# Patient Record
Sex: FEMALE | Race: WHITE | NOT HISPANIC OR LATINO | Employment: FULL TIME | URBAN - METROPOLITAN AREA
[De-identification: names, ages, dates, MRNs, and addresses within clinical notes are randomized per-mention and may not be internally consistent; named-entity substitution may affect disease eponyms.]

---

## 2018-02-02 ENCOUNTER — HOSPITAL ENCOUNTER (OUTPATIENT)
Facility: MEDICAL CENTER | Age: 53
End: 2018-02-02
Payer: COMMERCIAL

## 2018-02-02 LAB
ALBUMIN SERPL BCP-MCNC: 4.2 G/DL (ref 3.2–4.9)
ALBUMIN/GLOB SERPL: 1.4 G/DL
ALP SERPL-CCNC: 118 U/L (ref 30–99)
ALT SERPL-CCNC: 20 U/L (ref 2–50)
ANION GAP SERPL CALC-SCNC: 6 MMOL/L (ref 0–11.9)
AST SERPL-CCNC: 23 U/L (ref 12–45)
BDY FAT % MEASURED: 29.7 %
BILIRUB SERPL-MCNC: 0.4 MG/DL (ref 0.1–1.5)
BP DIAS: 90 MMHG
BP SYS: 150 MMHG
BUN SERPL-MCNC: 10 MG/DL (ref 8–22)
CALCIUM SERPL-MCNC: 8.9 MG/DL (ref 8.5–10.5)
CHLORIDE SERPL-SCNC: 108 MMOL/L (ref 96–112)
CHOLEST SERPL-MCNC: 169 MG/DL (ref 100–199)
CO2 SERPL-SCNC: 27 MMOL/L (ref 20–33)
CREAT SERPL-MCNC: 0.66 MG/DL (ref 0.5–1.4)
DIABETES HTDIA: NO
ERYTHROCYTE [DISTWIDTH] IN BLOOD BY AUTOMATED COUNT: 52.3 FL (ref 35.9–50)
EVENT NAME HTEVT: NORMAL
GLOBULIN SER CALC-MCNC: 3.1 G/DL (ref 1.9–3.5)
GLUCOSE SERPL-MCNC: 88 MG/DL (ref 65–99)
HCT VFR BLD AUTO: 40.4 % (ref 37–47)
HDLC SERPL-MCNC: 52 MG/DL
HGB BLD-MCNC: 11.2 G/DL (ref 12–16)
HYPERTENSION HTHYP: YES
LDLC SERPL CALC-MCNC: 101 MG/DL
MCH RBC QN AUTO: 21.3 PG (ref 27–33)
MCHC RBC AUTO-ENTMCNC: 27.7 G/DL (ref 33.6–35)
MCV RBC AUTO: 76.7 FL (ref 81.4–97.8)
PLATELET # BLD AUTO: 367 K/UL (ref 164–446)
PMV BLD AUTO: 10.4 FL (ref 9–12.9)
POTASSIUM SERPL-SCNC: 4.6 MMOL/L (ref 3.6–5.5)
PROT SERPL-MCNC: 7.3 G/DL (ref 6–8.2)
RBC # BLD AUTO: 5.27 M/UL (ref 4.2–5.4)
SCREENING LOC CITY HTCIT: NORMAL
SCREENING LOC STATE HTSTA: NORMAL
SCREENING LOCATION HTLOC: NORMAL
SODIUM SERPL-SCNC: 141 MMOL/L (ref 135–145)
SUBSCRIBER ID HTSID: NORMAL
TRIGL SERPL-MCNC: 81 MG/DL (ref 0–149)
WBC # BLD AUTO: 8.9 K/UL (ref 4.8–10.8)

## 2018-03-12 ENCOUNTER — HOSPITAL ENCOUNTER (OUTPATIENT)
Dept: RADIOLOGY | Facility: MEDICAL CENTER | Age: 53
End: 2018-03-12

## 2018-03-21 ENCOUNTER — HOSPITAL ENCOUNTER (OUTPATIENT)
Dept: LAB | Facility: MEDICAL CENTER | Age: 53
End: 2018-03-21
Attending: FAMILY MEDICINE
Payer: COMMERCIAL

## 2018-03-21 ENCOUNTER — OFFICE VISIT (OUTPATIENT)
Dept: MEDICAL GROUP | Facility: PHYSICIAN GROUP | Age: 53
End: 2018-03-21
Payer: COMMERCIAL

## 2018-03-21 VITALS
HEIGHT: 65 IN | OXYGEN SATURATION: 98 % | BODY MASS INDEX: 24.16 KG/M2 | WEIGHT: 145 LBS | TEMPERATURE: 97.4 F | SYSTOLIC BLOOD PRESSURE: 142 MMHG | HEART RATE: 71 BPM | DIASTOLIC BLOOD PRESSURE: 70 MMHG | RESPIRATION RATE: 14 BRPM

## 2018-03-21 DIAGNOSIS — I10 BENIGN ESSENTIAL HTN: ICD-10-CM

## 2018-03-21 DIAGNOSIS — Z20.2 STD EXPOSURE: ICD-10-CM

## 2018-03-21 DIAGNOSIS — Z72.0 TOBACCO ABUSE: ICD-10-CM

## 2018-03-21 LAB — HIV 1+2 AB+HIV1 P24 AG SERPL QL IA: NON REACTIVE

## 2018-03-21 PROCEDURE — 99204 OFFICE O/P NEW MOD 45 MIN: CPT | Performed by: FAMILY MEDICINE

## 2018-03-21 PROCEDURE — 36415 COLL VENOUS BLD VENIPUNCTURE: CPT

## 2018-03-21 PROCEDURE — 87389 HIV-1 AG W/HIV-1&-2 AB AG IA: CPT

## 2018-03-21 RX ORDER — BUPROPION HYDROCHLORIDE 150 MG/1
150 TABLET ORAL EVERY MORNING
Qty: 30 TAB | Refills: 3 | Status: SHIPPED | OUTPATIENT
Start: 2018-03-21 | End: 2018-06-20 | Stop reason: SDUPTHER

## 2018-03-21 RX ORDER — TRIAMTERENE AND HYDROCHLOROTHIAZIDE 37.5; 25 MG/1; MG/1
1 TABLET ORAL DAILY
Qty: 30 TAB | Refills: 3 | Status: SHIPPED | OUTPATIENT
Start: 2018-03-21 | End: 2018-05-08 | Stop reason: SDUPTHER

## 2018-03-21 ASSESSMENT — ENCOUNTER SYMPTOMS
PSYCHIATRIC NEGATIVE: 1
GASTROINTESTINAL NEGATIVE: 1
HEMOPTYSIS: 0
COUGH: 0
FEVER: 0
MYALGIAS: 0
PALPITATIONS: 0
CHILLS: 0
EYES NEGATIVE: 1
HYPERTENSION: 1
CONSTITUTIONAL NEGATIVE: 1
RESPIRATORY NEGATIVE: 1
CARDIOVASCULAR NEGATIVE: 1
CONSTIPATION: 0
NECK PAIN: 0
DIZZINESS: 0
MUSCULOSKELETAL NEGATIVE: 1
HEADACHES: 0
BLURRED VISION: 0
NEUROLOGICAL NEGATIVE: 1

## 2018-03-21 NOTE — PROGRESS NOTES
Subjective:      Brent Bailey is a 52 y.o. female who presents with Hypertension and Orders Needed (labs)            Was on meds before for htn and then stopped meds  150/90 at [facility] and high systolic today    There are no diagnoses linked to this encounter.  Past Medical History:  No date: Hypertension  History reviewed. No pertinent surgical history.  Smoking status: Current Every Day Smoker                                                   Packs/day: 0.50      Years: 20.00        Types: Cigarettes  Smokeless tobacco: Never Used                      Alcohol use: No              History reviewed.  No pertinent family history.      No current outpatient prescriptions on file.    Patient was instructed on the use of medications, either prescriptions or OTC and informed on when the appropriate follow up time period should be. In addition, patient was also instructed that should any acute worsening occur that they should notify this clinic asap or call 911.          Hypertension   This is a chronic problem. The current episode started more than 1 year ago. The problem has been waxing and waning since onset. The problem is uncontrolled. Pertinent negatives include no anxiety, blurred vision, chest pain, headaches, neck pain or palpitations. There are no known risk factors for coronary artery disease. Past treatments include nothing. There are no compliance problems.        Review of Systems   Constitutional: Negative.  Negative for chills and fever.        Past Medical History:  No date: Hypertension  History reviewed. No pertinent surgical history.  Smoking status: Current Every Day Smoker                                                   Packs/day: 0.50      Years: 20.00        Types: Cigarettes  Smokeless tobacco: Never Used                      Alcohol use: No              History reviewed.  No pertinent family history.     HENT: Negative.    Eyes: Negative.  Negative for blurred vision.   Respiratory:  "Negative.  Negative for cough and hemoptysis.    Cardiovascular: Negative.  Negative for chest pain and palpitations.   Gastrointestinal: Negative.  Negative for constipation.   Genitourinary: Negative.  Negative for dysuria and urgency.   Musculoskeletal: Negative.  Negative for myalgias and neck pain.   Skin: Negative.  Negative for rash.   Neurological: Negative.  Negative for dizziness and headaches.   Endo/Heme/Allergies: Negative.    Psychiatric/Behavioral: Negative.  Negative for suicidal ideas.          Objective:     /70   Pulse 71   Temp 36.3 °C (97.4 °F)   Resp 14   Ht 1.638 m (5' 4.5\")   Wt 65.8 kg (145 lb)   SpO2 98%   BMI 24.50 kg/m²      Physical Exam   Constitutional: She is oriented to person, place, and time. She appears well-developed and well-nourished. No distress.   HENT:   Head: Normocephalic and atraumatic.   Right Ear: External ear normal.   Left Ear: External ear normal.   Nose: Nose normal.   Mouth/Throat: Oropharynx is clear and moist. No oropharyngeal exudate.   Eyes: Pupils are equal, round, and reactive to light. Right eye exhibits no discharge. Left eye exhibits no discharge. No scleral icterus.   Neck: Normal range of motion. Neck supple. No JVD present. No tracheal deviation present. No thyromegaly present.   Cardiovascular: Normal rate, regular rhythm, normal heart sounds and intact distal pulses.  Exam reveals no gallop and no friction rub.    No murmur heard.  Pulmonary/Chest: Effort normal and breath sounds normal. No stridor. No respiratory distress. She has no wheezes. She has no rales. She exhibits no tenderness.   Abdominal: Soft. She exhibits no distension. There is no tenderness.   Lymphadenopathy:     She has no cervical adenopathy.   Neurological: She is alert and oriented to person, place, and time. No cranial nerve deficit.   Skin: She is not diaphoretic.   Psychiatric: She has a normal mood and affect. Her behavior is normal. Judgment and thought content " normal.   Nursing note and vitals reviewed.              Assessment/Plan:     htn - not on meds and with high systolic  Will start maxzide and f/u in 6 weeks    Also wants to stop smoking and will try zyban and nicotine gum prn

## 2018-05-08 ENCOUNTER — OFFICE VISIT (OUTPATIENT)
Dept: MEDICAL GROUP | Facility: PHYSICIAN GROUP | Age: 53
End: 2018-05-08
Payer: COMMERCIAL

## 2018-05-08 VITALS
BODY MASS INDEX: 24.16 KG/M2 | SYSTOLIC BLOOD PRESSURE: 122 MMHG | WEIGHT: 145 LBS | OXYGEN SATURATION: 97 % | TEMPERATURE: 96.9 F | HEART RATE: 73 BPM | DIASTOLIC BLOOD PRESSURE: 60 MMHG | HEIGHT: 65 IN | RESPIRATION RATE: 14 BRPM

## 2018-05-08 DIAGNOSIS — Z23 NEED FOR TDAP VACCINATION: ICD-10-CM

## 2018-05-08 DIAGNOSIS — I10 BENIGN ESSENTIAL HTN: ICD-10-CM

## 2018-05-08 DIAGNOSIS — Z00.00 WELL ADULT EXAM: ICD-10-CM

## 2018-05-08 PROCEDURE — 90471 IMMUNIZATION ADMIN: CPT | Performed by: FAMILY MEDICINE

## 2018-05-08 PROCEDURE — 99396 PREV VISIT EST AGE 40-64: CPT | Mod: 25 | Performed by: FAMILY MEDICINE

## 2018-05-08 PROCEDURE — 90715 TDAP VACCINE 7 YRS/> IM: CPT | Performed by: FAMILY MEDICINE

## 2018-05-08 RX ORDER — TRIAMTERENE AND HYDROCHLOROTHIAZIDE 37.5; 25 MG/1; MG/1
1 TABLET ORAL DAILY
Qty: 90 TAB | Refills: 1 | Status: SHIPPED | OUTPATIENT
Start: 2018-05-08 | End: 2018-12-11 | Stop reason: SDUPTHER

## 2018-05-08 ASSESSMENT — ENCOUNTER SYMPTOMS
CONSTITUTIONAL NEGATIVE: 1
DIZZINESS: 0
PSYCHIATRIC NEGATIVE: 1
HEADACHES: 0
NECK PAIN: 0
CONSTIPATION: 0
CHILLS: 0
MUSCULOSKELETAL NEGATIVE: 1
NEUROLOGICAL NEGATIVE: 1
FEVER: 0
GASTROINTESTINAL NEGATIVE: 1
COUGH: 0
MYALGIAS: 0
CARDIOVASCULAR NEGATIVE: 1
EYES NEGATIVE: 1
RESPIRATORY NEGATIVE: 1
PALPITATIONS: 0
HEMOPTYSIS: 0

## 2018-05-08 ASSESSMENT — PATIENT HEALTH QUESTIONNAIRE - PHQ9: CLINICAL INTERPRETATION OF PHQ2 SCORE: 0

## 2018-06-20 DIAGNOSIS — Z72.0 TOBACCO ABUSE: ICD-10-CM

## 2018-06-20 RX ORDER — BUPROPION HYDROCHLORIDE 150 MG/1
150 TABLET ORAL EVERY MORNING
Qty: 30 TAB | Refills: 3 | Status: SHIPPED | OUTPATIENT
Start: 2018-06-20 | End: 2018-11-26 | Stop reason: SDUPTHER

## 2018-06-20 NOTE — TELEPHONE ENCOUNTER
Was the patient seen in the last year in this department? Yes     Does patient have an active prescription for medications requested? No     Received Request Via: Patient     Last Visit: 05/08/18  Last Labs: 02/02/18  Next Appt: 11/12/18

## 2018-10-30 ENCOUNTER — IMMUNIZATION (OUTPATIENT)
Dept: SOCIAL WORK | Facility: CLINIC | Age: 53
End: 2018-10-30
Payer: COMMERCIAL

## 2018-10-30 DIAGNOSIS — Z23 NEED FOR VACCINATION: ICD-10-CM

## 2018-10-30 PROCEDURE — 90686 IIV4 VACC NO PRSV 0.5 ML IM: CPT | Performed by: REGISTERED NURSE

## 2018-10-30 PROCEDURE — 90471 IMMUNIZATION ADMIN: CPT | Performed by: REGISTERED NURSE

## 2018-11-26 DIAGNOSIS — Z72.0 TOBACCO ABUSE: ICD-10-CM

## 2018-11-26 RX ORDER — BUPROPION HYDROCHLORIDE 150 MG/1
150 TABLET ORAL EVERY MORNING
Qty: 30 TAB | Refills: 3 | Status: SHIPPED | OUTPATIENT
Start: 2018-11-26 | End: 2018-12-11 | Stop reason: SDUPTHER

## 2018-11-26 NOTE — TELEPHONE ENCOUNTER
Patient had an appointment with you on the 12th which was cancelled due to you being out. She is out of meds and is requesting a refill please to [facility]. She is scheduling a f/v.    Was the patient seen in the last year in this department? Yes    Does patient have an active prescription for medications requested? Yes    Received Request Via: Patient    Last Visit: 5/8/18  Last Lab: 2/2/18

## 2018-12-11 ENCOUNTER — OFFICE VISIT (OUTPATIENT)
Dept: MEDICAL GROUP | Facility: PHYSICIAN GROUP | Age: 53
End: 2018-12-11
Payer: COMMERCIAL

## 2018-12-11 VITALS
RESPIRATION RATE: 16 BRPM | DIASTOLIC BLOOD PRESSURE: 62 MMHG | TEMPERATURE: 98 F | SYSTOLIC BLOOD PRESSURE: 100 MMHG | HEART RATE: 68 BPM | HEIGHT: 65 IN | OXYGEN SATURATION: 98 % | BODY MASS INDEX: 22.09 KG/M2 | WEIGHT: 132.6 LBS

## 2018-12-11 DIAGNOSIS — J01.00 ACUTE NON-RECURRENT MAXILLARY SINUSITIS: ICD-10-CM

## 2018-12-11 DIAGNOSIS — Z72.0 TOBACCO ABUSE: ICD-10-CM

## 2018-12-11 DIAGNOSIS — Z12.11 ENCOUNTER FOR SCREENING FECAL OCCULT BLOOD TESTING: Primary | ICD-10-CM

## 2018-12-11 DIAGNOSIS — Z01.419 WELL WOMAN EXAM: ICD-10-CM

## 2018-12-11 DIAGNOSIS — I10 BENIGN ESSENTIAL HTN: ICD-10-CM

## 2018-12-11 PROCEDURE — 99214 OFFICE O/P EST MOD 30 MIN: CPT | Performed by: FAMILY MEDICINE

## 2018-12-11 RX ORDER — BUPROPION HYDROCHLORIDE 150 MG/1
150 TABLET ORAL EVERY MORNING
Qty: 90 TAB | Refills: 3 | Status: SHIPPED | OUTPATIENT
Start: 2018-12-11 | End: 2019-08-12 | Stop reason: SDUPTHER

## 2018-12-11 RX ORDER — AMOXICILLIN 500 MG/1
500 CAPSULE ORAL 3 TIMES DAILY
Qty: 30 CAP | Refills: 0 | Status: SHIPPED | OUTPATIENT
Start: 2018-12-11 | End: 2021-12-06

## 2018-12-11 RX ORDER — BENZONATATE 100 MG/1
100 CAPSULE ORAL 3 TIMES DAILY PRN
Qty: 60 CAP | Refills: 0 | Status: SHIPPED | OUTPATIENT
Start: 2018-12-11 | End: 2021-12-06

## 2018-12-11 RX ORDER — TRIAMTERENE AND HYDROCHLOROTHIAZIDE 37.5; 25 MG/1; MG/1
1 TABLET ORAL DAILY
Qty: 90 TAB | Refills: 1 | Status: SHIPPED | OUTPATIENT
Start: 2018-12-11 | End: 2019-08-12 | Stop reason: SDUPTHER

## 2018-12-11 ASSESSMENT — ENCOUNTER SYMPTOMS
NAUSEA: 0
EYES NEGATIVE: 1
DEPRESSION: 0
RESPIRATORY NEGATIVE: 1
DIZZINESS: 0
BRUISES/BLEEDS EASILY: 0
PSYCHIATRIC NEGATIVE: 1
NEUROLOGICAL NEGATIVE: 1
TINGLING: 0
MYALGIAS: 0
SINUS PAIN: 1
CARDIOVASCULAR NEGATIVE: 1
MUSCULOSKELETAL NEGATIVE: 1
PALPITATIONS: 0
HEMOPTYSIS: 0
HEADACHES: 0
DOUBLE VISION: 0
BLURRED VISION: 0
HEARTBURN: 0
FEVER: 0
GASTROINTESTINAL NEGATIVE: 1
CONSTITUTIONAL NEGATIVE: 1
COUGH: 0
CHILLS: 0

## 2018-12-11 NOTE — PROGRESS NOTES
Subjective:      Brent Bailey is a 52 y.o. female who presents with Hypertension (Med refills) and Sinusitis (x3 weeks)            1. Tobacco abuse  Patient is still currently using tobacco. They were counseled on the importance of cessation and various behavioural and pharmacological ways of achieving this.  UNCONTROLLED    - buPROPion (WELLBUTRIN XL) 150 MG XL tablet; Take 1 Tab by mouth every morning.  Dispense: 90 Tab; Refill: 3    2. Benign essential HTN  Currently treated for HTN, taking meds with no CP or sob, monitors bp at home periodically. controlled    - triamterene-hctz (MAXZIDE-25/DYAZIDE) 37.5-25 MG Tab; Take 1 Tab by mouth every day.  Dispense: 90 Tab; Refill: 1    3. Encounter for screening fecal occult blood testing    - OCCULT BLOOD FECES IMMUNOASSAY (FIT); Future    4. Well woman exam  =  - MA-MAMMO SCREENING BILAT W/JAMES W/O CAD; Future    5. Acute non-recurrent maxillary sinusitis  Cough and sinus drainage for one week  - amoxicillin (AMOXIL) 500 MG Cap; Take 1 Cap by mouth 3 times a day.  Dispense: 30 Cap; Refill: 0  - benzonatate (TESSALON) 100 MG Cap; Take 1 Cap by mouth 3 times a day as needed for Cough.  Dispense: 60 Cap; Refill: 0    Past Medical History:  No date: Hypertension  History reviewed. No pertinent surgical history.  Smoking status: Former Smoker                                                              Packs/day: 0.50      Years: 20.00        Types: Cigarettes     Quit date: 6/11/2018  Smokeless tobacco: Never Used                      Alcohol use: Yes              Comment: occassional wine    History reviewed.  No pertinent family history.      Current Outpatient Prescriptions: •  buPROPion (WELLBUTRIN XL) 150 MG XL tablet, Take 1 Tab by mouth every morning., Disp: 90 Tab, Rfl: 3•  triamterene-hctz (MAXZIDE-25/DYAZIDE) 37.5-25 MG Tab, Take 1 Tab by mouth every day., Disp: 90 Tab, Rfl: 1•  amoxicillin (AMOXIL) 500 MG Cap, Take 1 Cap by mouth 3 times a day., Disp: 30 Cap,  "Rfl: 0•  benzonatate (TESSALON) 100 MG Cap, Take 1 Cap by mouth 3 times a day as needed for Cough., Disp: 60 Cap, Rfl: 0    Patient was instructed on the use of medications, either prescriptions or OTC and informed on when the appropriate follow up time period should be. In addition, patient was also instructed that should any acute worsening occur that they should notify this clinic asap or call 911.            Review of Systems   Constitutional: Negative.  Negative for chills and fever.   HENT: Positive for congestion and sinus pain. Negative for hearing loss.    Eyes: Negative.  Negative for blurred vision and double vision.   Respiratory: Negative.  Negative for cough and hemoptysis.    Cardiovascular: Negative.  Negative for chest pain and palpitations.   Gastrointestinal: Negative.  Negative for heartburn and nausea.   Genitourinary: Negative.  Negative for dysuria.   Musculoskeletal: Negative.  Negative for myalgias.   Skin: Negative.  Negative for rash.   Neurological: Negative.  Negative for dizziness, tingling and headaches.   Endo/Heme/Allergies: Negative.  Does not bruise/bleed easily.   Psychiatric/Behavioral: Negative.  Negative for depression and suicidal ideas.   All other systems reviewed and are negative.         Objective:     /62 (BP Location: Left arm, Patient Position: Sitting, BP Cuff Size: Adult)   Pulse 68   Temp 36.7 °C (98 °F)   Resp 16   Ht 1.638 m (5' 4.5\")   Wt 60.1 kg (132 lb 9.6 oz)   SpO2 98%   BMI 22.41 kg/m²      Physical Exam   Constitutional: She is oriented to person, place, and time. She appears well-developed and well-nourished. No distress.   HENT:   Head: Normocephalic and atraumatic.   Nose: Mucosal edema and rhinorrhea present. Right sinus exhibits maxillary sinus tenderness and frontal sinus tenderness. Left sinus exhibits maxillary sinus tenderness and frontal sinus tenderness.   Mouth/Throat: Oropharynx is clear and moist. No oropharyngeal exudate.   Eyes: " Pupils are equal, round, and reactive to light.   Cardiovascular: Normal rate, regular rhythm, normal heart sounds and intact distal pulses.  Exam reveals no gallop and no friction rub.    No murmur heard.  Pulmonary/Chest: Effort normal and breath sounds normal. No respiratory distress. She has no wheezes. She has no rales. She exhibits no tenderness.   Neurological: She is alert and oriented to person, place, and time.   Skin: She is not diaphoretic.   Psychiatric: She has a normal mood and affect. Her behavior is normal. Judgment and thought content normal.   Nursing note and vitals reviewed.              Assessment/Plan:     1. Tobacco abuse    - buPROPion (WELLBUTRIN XL) 150 MG XL tablet; Take 1 Tab by mouth every morning.  Dispense: 90 Tab; Refill: 3    2. Benign essential HTN    - triamterene-hctz (MAXZIDE-25/DYAZIDE) 37.5-25 MG Tab; Take 1 Tab by mouth every day.  Dispense: 90 Tab; Refill: 1    3. Encounter for screening fecal occult blood testing    - OCCULT BLOOD FECES IMMUNOASSAY (FIT); Future    4. Well woman exam    - MA-MAMMO SCREENING BILAT W/JAMES W/O CAD; Future    5. Acute non-recurrent maxillary sinusitis  - amoxicillin (AMOXIL) 500 MG Cap; Take 1 Cap by mouth 3 times a day.  Dispense: 30 Cap; Refill: 0  - benzonatate (TESSALON) 100 MG Cap; Take 1 Cap by mouth 3 times a day as needed for Cough.  Dispense: 60 Cap; Refill: 0

## 2018-12-18 ENCOUNTER — HOSPITAL ENCOUNTER (OUTPATIENT)
Facility: MEDICAL CENTER | Age: 53
End: 2018-12-18
Attending: NURSE PRACTITIONER
Payer: COMMERCIAL

## 2018-12-18 ENCOUNTER — OFFICE VISIT (OUTPATIENT)
Dept: MEDICAL GROUP | Facility: PHYSICIAN GROUP | Age: 53
End: 2018-12-18
Payer: COMMERCIAL

## 2018-12-18 VITALS
TEMPERATURE: 97.5 F | WEIGHT: 132.6 LBS | OXYGEN SATURATION: 96 % | HEIGHT: 65 IN | BODY MASS INDEX: 22.09 KG/M2 | SYSTOLIC BLOOD PRESSURE: 100 MMHG | DIASTOLIC BLOOD PRESSURE: 60 MMHG | HEART RATE: 71 BPM | RESPIRATION RATE: 18 BRPM

## 2018-12-18 DIAGNOSIS — Z12.4 SCREENING FOR MALIGNANT NEOPLASM OF CERVIX: ICD-10-CM

## 2018-12-18 DIAGNOSIS — Z00.00 ANNUAL PHYSICAL EXAM: ICD-10-CM

## 2018-12-18 PROBLEM — Z72.0 TOBACCO ABUSE: Status: RESOLVED | Noted: 2018-12-11 | Resolved: 2018-12-18

## 2018-12-18 PROCEDURE — 87491 CHLMYD TRACH DNA AMP PROBE: CPT

## 2018-12-18 PROCEDURE — 87624 HPV HI-RISK TYP POOLED RSLT: CPT

## 2018-12-18 PROCEDURE — 99396 PREV VISIT EST AGE 40-64: CPT | Performed by: NURSE PRACTITIONER

## 2018-12-18 PROCEDURE — 87591 N.GONORRHOEAE DNA AMP PROB: CPT

## 2018-12-18 PROCEDURE — 88175 CYTOPATH C/V AUTO FLUID REDO: CPT

## 2018-12-19 DIAGNOSIS — Z12.4 SCREENING FOR MALIGNANT NEOPLASM OF CERVIX: ICD-10-CM

## 2018-12-19 DIAGNOSIS — Z00.00 ANNUAL PHYSICAL EXAM: ICD-10-CM

## 2018-12-19 NOTE — PROGRESS NOTES
Chief Complaint: ANNUAL PHYSICAL EXAM    Brent Bailey is a 53 y.o. Female who presents for annual exam    Health Maintenance: She needs mammogram and colonoscopy. Order have been previously placed.    Regular exercise: yes; she walks 5-6 miles daily    Patient Active Problem List   Diagnosis   • Benign essential HTN      has a past medical history of Hypertension. She also has no past medical history of Diabetes (HCC) or Hyperlipidemia.     has no past surgical history on file.    Current Outpatient Prescriptions   Medication Sig Dispense Refill   • buPROPion (WELLBUTRIN XL) 150 MG XL tablet Take 1 Tab by mouth every morning. 90 Tab 3   • triamterene-hctz (MAXZIDE-25/DYAZIDE) 37.5-25 MG Tab Take 1 Tab by mouth every day. 90 Tab 1   • amoxicillin (AMOXIL) 500 MG Cap Take 1 Cap by mouth 3 times a day. 30 Cap 0   • benzonatate (TESSALON) 100 MG Cap Take 1 Cap by mouth 3 times a day as needed for Cough. 60 Cap 0     No current facility-administered medications for this visit.        Social History   Substance Use Topics   • Smoking status: Former Smoker     Packs/day: 0.50     Years: 20.00     Types: Cigarettes     Quit date: 6/11/2018   • Smokeless tobacco: Never Used   • Alcohol use Yes      Comment: occassional wine     Review Of Systems  Denies fever, chills, or sweats, unexplained weight changes  Skin: negative for rash, changing moles, abnormal pigmentation, hair or nail changes.  Eyes: negative for visual blurring, double vision, eye pain, floaters and discharge from eyes  Ears/Nose/Throat: negative for tinnitus, vertigo, oral or dental problem, hoarseness, frequent URI's, sinus trouble, persistent sore throat  Respiratory: negative for persistent cough, hemoptysis, dyspnea, wheezing  Cardiovascular: negative for palpitations, tachycardia, irregular heart beat, chest pain or pressure or peripheral edema.  Breast: Denies breast tenderness, mass,  changes in size or contour, or abnormal cyclic  "discomfort.  Gastrointestinal: negative for dysphagia or odynophagia, nausea, heartburn or reflux, abdominal pain, hemorrhoids, constipation or diarrhea, black stool or bloody stool  Genitourinary: negative for nocturia, dysuria, frequency, incontinence, abnormal vaginal discharge, dysparunia or abnormal vaginal bleeding. Her LMP was June 2018.  Musculoskeletal: negative for joint swelling and muscle pain/ soreness  Neurologic: negative for new or changing headaches, new weakness tremor  Psychiatric: negative for mood or sleep disturbance, anxiety, depression, sexual difficulties  Hematologic/Lymphatic/Immunologic: negative for pallor, unusual bruising, swollen glands,    PHYSICAL EXAMINATION:  Blood pressure 100/60, pulse 71, temperature 36.4 °C (97.5 °F), temperature source Temporal, resp. rate 18, height 1.638 m (5' 4.5\"), weight 60.1 kg (132 lb 9.6 oz), SpO2 96 %.  Body mass index is 22.41 kg/m².  Wt Readings from Last 4 Encounters:   12/18/18 60.1 kg (132 lb 9.6 oz)   12/11/18 60.1 kg (132 lb 9.6 oz)   05/08/18 65.8 kg (145 lb)   03/21/18 65.8 kg (145 lb)     Constitutional: Oriented to person, place, and time and well-developed, well-nourished, and in no distress.   HENT:   Head: Normocephalic and atraumatic.   Right Ear: Tympanic membrane and external ear normal.   Left Ear: Tympanic membrane and external ear normal.   Mouth/Throat: Oropharynx is clear and moist and mucous membranes are normal. No oropharyngeal exudate or posterior oropharyngeal erythema.   Eyes: Conjunctivae and EOM are normal. Pupils are equal, round, and reactive to light.   Neck: Normal range of motion. Neck supple. No thyromegaly present.   Cardiovascular: Normal rate, regular rhythm, normal heart sounds. Radial and pedal pulses intact. Exam reveals no friction rub. No murmur heard.  Pulmonary/Chest: Effort normal and breath sounds normal. No respiratory distress or use of accessory muscles. No wheezes, rhonchi, or rales.   Abdominal: " Soft. Bowel sounds are normal. Exhibits no distension and no mass. There is no tenderness. No hepatosplenomegaly.    Musculoskeletal: Full range of motion. No deformity or swelling of joints. DTRs intact.   Lymphadenopathy:     No cervical adenopathy.   Neurological: Alert and oriented to person, place, and time. Gait normal.   Skin: Skin is warm and dry. No cyanosis. No edema.  Psychiatric: Mood, memory, affect and judgment normal.     ASSESSMENT/PLAN:  1. Annual physical exam  THINPREP PAP W/HPV AND CTNG   2. Screening for malignant neoplasm of cervix  THINPREP PAP W/HPV AND CTNG     HCM:    Labs ordered  Immunizations per orders  Pt counseled about skin care, diet, supplements, and exercise. Recommend OTC calcium supplement of 1600mg daily and Vit D3 2000 units daily for prevention of osteoporosis.  Next office visit for recheck of chronic medical conditions is due in 6 months for HTN.

## 2018-12-20 LAB
C TRACH DNA GENITAL QL NAA+PROBE: NEGATIVE
CYTOLOGY REG CYTOL: NORMAL
HPV HR 12 DNA CVX QL NAA+PROBE: NEGATIVE
HPV16 DNA SPEC QL NAA+PROBE: NEGATIVE
HPV18 DNA SPEC QL NAA+PROBE: NEGATIVE
N GONORRHOEA DNA GENITAL QL NAA+PROBE: NEGATIVE
SPECIMEN SOURCE: NORMAL
SPECIMEN SOURCE: NORMAL

## 2018-12-24 ENCOUNTER — TELEPHONE (OUTPATIENT)
Dept: MEDICAL GROUP | Facility: PHYSICIAN GROUP | Age: 53
End: 2018-12-24

## 2018-12-24 NOTE — TELEPHONE ENCOUNTER
----- Message from LAMINE Velasco sent at 12/21/2018  8:39 AM PST -----  Pap results show no abnormal findings, including infection or malignancy. Routine screening in 3 years is recommended.    LAMINE Velasco,SHERLYN-C

## 2018-12-26 ENCOUNTER — TELEPHONE (OUTPATIENT)
Dept: MEDICAL GROUP | Facility: PHYSICIAN GROUP | Age: 53
End: 2018-12-26

## 2018-12-26 NOTE — TELEPHONE ENCOUNTER
Patient returned Deondre's phone call regarding test results. I informed her of results and instructions.

## 2019-03-15 ENCOUNTER — HOSPITAL ENCOUNTER (OUTPATIENT)
Facility: MEDICAL CENTER | Age: 54
End: 2019-03-15
Payer: COMMERCIAL

## 2019-03-15 LAB
BDY FAT % MEASURED: 30.9 %
BP DIAS: 74 MMHG
BP SYS: 110 MMHG
CHOLEST SERPL-MCNC: 195 MG/DL (ref 100–199)
DIABETES HTDIA: NO
EVENT NAME HTEVT: NORMAL
FASTING STATUS PATIENT QL REPORTED: NORMAL
GLUCOSE SERPL-MCNC: 90 MG/DL (ref 65–99)
HDLC SERPL-MCNC: 55 MG/DL
HYPERTENSION HTHYP: YES
LDLC SERPL CALC-MCNC: 113 MG/DL
SCREENING LOC CITY HTCIT: NORMAL
SCREENING LOC STATE HTSTA: NORMAL
SCREENING LOCATION HTLOC: NORMAL
SUBSCRIBER ID HTSID: NORMAL
TRIGL SERPL-MCNC: 135 MG/DL (ref 0–149)

## 2019-04-18 ENCOUNTER — HOSPITAL ENCOUNTER (OUTPATIENT)
Facility: MEDICAL CENTER | Age: 54
End: 2019-04-18
Attending: FAMILY MEDICINE
Payer: COMMERCIAL

## 2019-04-18 PROCEDURE — 82274 ASSAY TEST FOR BLOOD FECAL: CPT

## 2019-04-23 DIAGNOSIS — Z12.11 ENCOUNTER FOR SCREENING FECAL OCCULT BLOOD TESTING: ICD-10-CM

## 2019-04-23 LAB — HEMOCCULT STL QL IA: NEGATIVE

## 2019-06-13 ENCOUNTER — OFFICE VISIT (OUTPATIENT)
Dept: MEDICAL GROUP | Facility: PHYSICIAN GROUP | Age: 54
End: 2019-06-13
Payer: COMMERCIAL

## 2019-06-13 VITALS
HEIGHT: 65 IN | RESPIRATION RATE: 12 BRPM | BODY MASS INDEX: 23.32 KG/M2 | TEMPERATURE: 97.8 F | SYSTOLIC BLOOD PRESSURE: 124 MMHG | WEIGHT: 140 LBS | DIASTOLIC BLOOD PRESSURE: 72 MMHG | HEART RATE: 86 BPM | OXYGEN SATURATION: 96 %

## 2019-06-13 DIAGNOSIS — Z12.12 SCREENING FOR COLORECTAL CANCER: ICD-10-CM

## 2019-06-13 DIAGNOSIS — Z12.11 SCREENING FOR COLORECTAL CANCER: ICD-10-CM

## 2019-06-13 DIAGNOSIS — Z01.419 WELL WOMAN EXAM: ICD-10-CM

## 2019-06-13 DIAGNOSIS — I10 BENIGN ESSENTIAL HTN: ICD-10-CM

## 2019-06-13 PROCEDURE — 99396 PREV VISIT EST AGE 40-64: CPT | Performed by: FAMILY MEDICINE

## 2019-06-13 ASSESSMENT — ENCOUNTER SYMPTOMS
DEPRESSION: 0
PSYCHIATRIC NEGATIVE: 1
GASTROINTESTINAL NEGATIVE: 1
BLURRED VISION: 0
FEVER: 0
NEUROLOGICAL NEGATIVE: 1
COUGH: 0
HEARTBURN: 0
CHILLS: 0
EYES NEGATIVE: 1
HEADACHES: 0
PALPITATIONS: 0
CARDIOVASCULAR NEGATIVE: 1
BRUISES/BLEEDS EASILY: 0
MUSCULOSKELETAL NEGATIVE: 1
NAUSEA: 0
RESPIRATORY NEGATIVE: 1
MYALGIAS: 0
CONSTITUTIONAL NEGATIVE: 1
DIZZINESS: 0
DOUBLE VISION: 0
HEMOPTYSIS: 0
TINGLING: 0

## 2019-06-13 ASSESSMENT — PATIENT HEALTH QUESTIONNAIRE - PHQ9: CLINICAL INTERPRETATION OF PHQ2 SCORE: 0

## 2019-06-13 NOTE — PROGRESS NOTES
Subjective:      Brent Bailey is a 53 y.o. female who presents with Hypertension (go over labs)            1. Screening for colorectal cancer    - REFERRAL TO GI FOR COLONOSCOPY    2. Well woman exam  Needs to do this for HM  - MA-SCREEN MAMMO W/CAD-BILAT; Future    3. Benign essential HTN  Currently treated for HTN, taking meds with no CP or sob, monitors bp at home periodically. controlled      Past Medical History:  No date: Hypertension  No past surgical history on file.  Smoking status: Former Smoker                                                              Packs/day: 0.50      Years: 20.00        Types: Cigarettes     Quit date: 6/11/2018  Smokeless tobacco: Never Used                      Alcohol use: Yes              Comment: occassional wine    No family history on file.      Current Outpatient Prescriptions: •  buPROPion (WELLBUTRIN XL) 150 MG XL tablet, Take 1 Tab by mouth every morning., Disp: 90 Tab, Rfl: 3•  triamterene-hctz (MAXZIDE-25/DYAZIDE) 37.5-25 MG Tab, Take 1 Tab by mouth every day., Disp: 90 Tab, Rfl: 1•  amoxicillin (AMOXIL) 500 MG Cap, Take 1 Cap by mouth 3 times a day. (Patient not taking: Reported on 6/13/2019), Disp: 30 Cap, Rfl: 0•  benzonatate (TESSALON) 100 MG Cap, Take 1 Cap by mouth 3 times a day as needed for Cough. (Patient not taking: Reported on 6/13/2019), Disp: 60 Cap, Rfl: 0    Patient was instructed on the use of medications, either prescriptions or OTC and informed on when the appropriate follow up time period should be. In addition, patient was also instructed that should any acute worsening occur that they should notify this clinic asap or call 911.            Review of Systems   Constitutional: Negative.  Negative for chills and fever.   HENT: Negative.  Negative for hearing loss.    Eyes: Negative.  Negative for blurred vision and double vision.   Respiratory: Negative.  Negative for cough and hemoptysis.    Cardiovascular: Negative.  Negative for chest pain and  "palpitations.   Gastrointestinal: Negative.  Negative for heartburn and nausea.   Genitourinary: Negative.  Negative for dysuria.   Musculoskeletal: Negative.  Negative for myalgias.   Skin: Negative.  Negative for rash.   Neurological: Negative.  Negative for dizziness, tingling and headaches.   Endo/Heme/Allergies: Negative.  Does not bruise/bleed easily.   Psychiatric/Behavioral: Negative.  Negative for depression and suicidal ideas.   All other systems reviewed and are negative.         Objective:     /72   Pulse 86   Temp 36.6 °C (97.8 °F)   Resp 12   Ht 1.638 m (5' 4.5\")   Wt 63.5 kg (140 lb)   SpO2 96%   BMI 23.66 kg/m²      Physical Exam   Constitutional: She is oriented to person, place, and time. She appears well-developed and well-nourished. No distress.   HENT:   Head: Normocephalic and atraumatic.   Mouth/Throat: Oropharynx is clear and moist. No oropharyngeal exudate.   Eyes: Pupils are equal, round, and reactive to light.   Cardiovascular: Normal rate, regular rhythm, normal heart sounds and intact distal pulses.  Exam reveals no gallop and no friction rub.    No murmur heard.  Pulmonary/Chest: Effort normal and breath sounds normal. No respiratory distress. She has no wheezes. She has no rales. She exhibits no tenderness.   Neurological: She is alert and oriented to person, place, and time.   Skin: She is not diaphoretic.   Psychiatric: She has a normal mood and affect. Her behavior is normal. Judgment and thought content normal.   Nursing note and vitals reviewed.              Assessment/Plan:     1. Screening for colorectal cancer    - REFERRAL TO GI FOR COLONOSCOPY    2. Well woman exam    - MA-SCREEN MAMMO W/CAD-BILAT; Future    3. Benign essential HTN        "

## 2019-08-12 ENCOUNTER — OFFICE VISIT (OUTPATIENT)
Dept: MEDICAL GROUP | Facility: PHYSICIAN GROUP | Age: 54
End: 2019-08-12
Payer: COMMERCIAL

## 2019-08-12 VITALS
TEMPERATURE: 97.2 F | OXYGEN SATURATION: 97 % | RESPIRATION RATE: 14 BRPM | HEIGHT: 65 IN | SYSTOLIC BLOOD PRESSURE: 110 MMHG | WEIGHT: 135 LBS | DIASTOLIC BLOOD PRESSURE: 80 MMHG | HEART RATE: 74 BPM | BODY MASS INDEX: 22.49 KG/M2

## 2019-08-12 DIAGNOSIS — I10 BENIGN ESSENTIAL HTN: ICD-10-CM

## 2019-08-12 DIAGNOSIS — L30.9 DERMATITIS: ICD-10-CM

## 2019-08-12 DIAGNOSIS — M70.32 OTHER BURSITIS OF ELBOW, LEFT ELBOW: ICD-10-CM

## 2019-08-12 PROCEDURE — 99214 OFFICE O/P EST MOD 30 MIN: CPT | Performed by: FAMILY MEDICINE

## 2019-08-12 RX ORDER — TRIAMTERENE AND HYDROCHLOROTHIAZIDE 37.5; 25 MG/1; MG/1
1 TABLET ORAL DAILY
Qty: 90 TAB | Refills: 1 | Status: SHIPPED | OUTPATIENT
Start: 2019-08-12 | End: 2019-12-30 | Stop reason: SDUPTHER

## 2019-08-12 RX ORDER — BUPROPION HYDROCHLORIDE 150 MG/1
150 TABLET ORAL EVERY MORNING
Qty: 90 TAB | Refills: 3 | Status: SHIPPED | OUTPATIENT
Start: 2019-08-12 | End: 2019-12-24 | Stop reason: SDUPTHER

## 2019-08-12 ASSESSMENT — ENCOUNTER SYMPTOMS
CHILLS: 0
NEUROLOGICAL NEGATIVE: 1
NAUSEA: 0
CONSTITUTIONAL NEGATIVE: 1
HEADACHES: 0
RESPIRATORY NEGATIVE: 1
HEARTBURN: 0
PALPITATIONS: 0
HEMOPTYSIS: 0
BRUISES/BLEEDS EASILY: 0
PSYCHIATRIC NEGATIVE: 1
GASTROINTESTINAL NEGATIVE: 1
EYES NEGATIVE: 1
DEPRESSION: 0
DIZZINESS: 0
DOUBLE VISION: 0
CARDIOVASCULAR NEGATIVE: 1
MYALGIAS: 0
COUGH: 0
FEVER: 0
TINGLING: 0
BLURRED VISION: 0

## 2019-08-13 NOTE — PROGRESS NOTES
Subjective:      Brent Bailey is a 53 y.o. female who presents with Referral Needed (Dermotology) and Elbow Injury            1. Dermatitis  Itchy rash on both elbows  - REFERRAL TO DERMATOLOGY    2. Benign essential HTN  Currently treated for HTN, taking meds with no CP or sob, monitors bp at home periodically. controlled    - triamterene-hctz (MAXZIDE-25/DYAZIDE) 37.5-25 MG Tab; Take 1 Tab by mouth every day.  Dispense: 90 Tab; Refill: 1  - buPROPion (WELLBUTRIN XL) 150 MG XL tablet; Take 1 Tab by mouth every morning.  Dispense: 90 Tab; Refill: 3    3. Other bursitis of elbow, left elbow  Fell on left elbow 3 months ago, still with some swelling  On exam minimal swelling of olecranon bursa, advised on compression sleeve    Past Medical History:  No date: Hypertension  History reviewed. No pertinent surgical history.  Social History    Tobacco Use      Smoking status: Former Smoker        Packs/day: 0.50        Years: 20.00        Pack years: 10        Types: Cigarettes        Quit date: 2018        Years since quittin.1      Smokeless tobacco: Never Used    Alcohol use: Yes      Comment: occassional wine    Drug use: No    History reviewed.  No pertinent family history.      Current Outpatient Medications: •  triamterene-hctz (MAXZIDE-25/DYAZIDE) 37.5-25 MG Tab, Take 1 Tab by mouth every day., Disp: 90 Tab, Rfl: 1•  buPROPion (WELLBUTRIN XL) 150 MG XL tablet, Take 1 Tab by mouth every morning., Disp: 90 Tab, Rfl: 3•  amoxicillin (AMOXIL) 500 MG Cap, Take 1 Cap by mouth 3 times a day. (Patient not taking: Reported on 2019), Disp: 30 Cap, Rfl: 0•  benzonatate (TESSALON) 100 MG Cap, Take 1 Cap by mouth 3 times a day as needed for Cough. (Patient not taking: Reported on 2019), Disp: 60 Cap, Rfl: 0    Patient was instructed on the use of medications, either prescriptions or OTC and informed on when the appropriate follow up time period should be. In addition, patient was also instructed that should  "any acute worsening occur that they should notify this clinic asap or call 911.          Review of Systems   Constitutional: Negative.  Negative for chills and fever.   HENT: Negative.  Negative for hearing loss.    Eyes: Negative.  Negative for blurred vision and double vision.   Respiratory: Negative.  Negative for cough and hemoptysis.    Cardiovascular: Negative.  Negative for chest pain and palpitations.   Gastrointestinal: Negative.  Negative for heartburn and nausea.   Genitourinary: Negative.  Negative for dysuria.   Musculoskeletal: Positive for joint pain. Negative for myalgias.   Skin: Positive for itching and rash.   Neurological: Negative.  Negative for dizziness, tingling and headaches.   Endo/Heme/Allergies: Negative.  Does not bruise/bleed easily.   Psychiatric/Behavioral: Negative.  Negative for depression and suicidal ideas.   All other systems reviewed and are negative.         Objective:     /80 (BP Location: Left arm, Patient Position: Sitting, BP Cuff Size: Adult)   Pulse 74   Temp 36.2 °C (97.2 °F)   Resp 14   Ht 1.651 m (5' 5\")   Wt 61.2 kg (135 lb)   SpO2 97%   BMI 22.47 kg/m²      Physical Exam   Constitutional: She is oriented to person, place, and time. She appears well-developed and well-nourished. No distress.   HENT:   Head: Normocephalic and atraumatic.   Mouth/Throat: Oropharynx is clear and moist. No oropharyngeal exudate.   Eyes: Pupils are equal, round, and reactive to light.   Cardiovascular: Normal rate, regular rhythm, normal heart sounds and intact distal pulses. Exam reveals no gallop and no friction rub.   No murmur heard.  Pulmonary/Chest: Effort normal and breath sounds normal. No respiratory distress. She has no wheezes. She has no rales. She exhibits no tenderness.   Musculoskeletal:        Left elbow: She exhibits swelling and effusion.   Neurological: She is alert and oriented to person, place, and time.   Skin: Rash noted. Rash is macular. She is not " diaphoretic.        Psychiatric: She has a normal mood and affect. Her behavior is normal. Judgment and thought content normal.   Nursing note and vitals reviewed.              Assessment/Plan:     1. Dermatiti    - REFERRAL TO DERMATOLOGY    2. Benign essential HTN    - triamterene-hctz (MAXZIDE-25/DYAZIDE) 37.5-25 MG Tab; Take 1 Tab by mouth every day.  Dispense: 90 Tab; Refill: 1  - buPROPion (WELLBUTRIN XL) 150 MG XL tablet; Take 1 Tab by mouth every morning.  Dispense: 90 Tab; Refill: 3    3. Other bursitis of elbow, left elbow

## 2019-10-02 ENCOUNTER — TELEPHONE (OUTPATIENT)
Dept: MEDICAL GROUP | Facility: PHYSICIAN GROUP | Age: 54
End: 2019-10-02

## 2019-10-02 NOTE — TELEPHONE ENCOUNTER
Pt called stating that the swelling on her left elbow has gone down since her visit on 8/12 and would like an x ray done.

## 2019-10-09 ENCOUNTER — APPOINTMENT (OUTPATIENT)
Dept: URGENT CARE | Facility: CLINIC | Age: 54
End: 2019-10-09
Payer: COMMERCIAL

## 2019-10-09 ENCOUNTER — APPOINTMENT (OUTPATIENT)
Dept: RADIOLOGY | Facility: IMAGING CENTER | Age: 54
End: 2019-10-09
Attending: FAMILY MEDICINE
Payer: COMMERCIAL

## 2019-10-09 DIAGNOSIS — M70.32 OTHER BURSITIS OF ELBOW, LEFT ELBOW: ICD-10-CM

## 2019-10-09 PROCEDURE — 73070 X-RAY EXAM OF ELBOW: CPT | Mod: TC,LT | Performed by: PHYSICIAN ASSISTANT

## 2019-10-17 ENCOUNTER — IMMUNIZATION (OUTPATIENT)
Dept: SOCIAL WORK | Facility: CLINIC | Age: 54
End: 2019-10-17
Payer: COMMERCIAL

## 2019-10-17 DIAGNOSIS — Z23 NEED FOR VACCINATION: ICD-10-CM

## 2019-10-17 PROCEDURE — 90471 IMMUNIZATION ADMIN: CPT | Performed by: REGISTERED NURSE

## 2019-10-17 PROCEDURE — 90686 IIV4 VACC NO PRSV 0.5 ML IM: CPT | Performed by: REGISTERED NURSE

## 2019-10-28 ENCOUNTER — OFFICE VISIT (OUTPATIENT)
Dept: MEDICAL GROUP | Facility: PHYSICIAN GROUP | Age: 54
End: 2019-10-28
Payer: COMMERCIAL

## 2019-10-28 VITALS
HEIGHT: 65 IN | DIASTOLIC BLOOD PRESSURE: 70 MMHG | OXYGEN SATURATION: 97 % | BODY MASS INDEX: 22.38 KG/M2 | WEIGHT: 134.3 LBS | HEART RATE: 74 BPM | TEMPERATURE: 97.1 F | SYSTOLIC BLOOD PRESSURE: 102 MMHG | RESPIRATION RATE: 16 BRPM

## 2019-10-28 DIAGNOSIS — I10 BENIGN ESSENTIAL HTN: ICD-10-CM

## 2019-10-28 DIAGNOSIS — Z72.0 TOBACCO ABUSE: ICD-10-CM

## 2019-10-28 DIAGNOSIS — M70.32 OTHER BURSITIS OF ELBOW, LEFT ELBOW: ICD-10-CM

## 2019-10-28 PROCEDURE — 99214 OFFICE O/P EST MOD 30 MIN: CPT | Performed by: FAMILY MEDICINE

## 2019-10-28 RX ORDER — METHYLPREDNISOLONE 4 MG/1
TABLET ORAL
Qty: 21 TAB | Refills: 0 | Status: SHIPPED | OUTPATIENT
Start: 2019-10-28 | End: 2021-12-06

## 2019-10-28 ASSESSMENT — ENCOUNTER SYMPTOMS
HEARTBURN: 0
DOUBLE VISION: 0
CARDIOVASCULAR NEGATIVE: 1
BLURRED VISION: 0
DIZZINESS: 0
CONSTITUTIONAL NEGATIVE: 1
COUGH: 0
BRUISES/BLEEDS EASILY: 0
NEUROLOGICAL NEGATIVE: 1
PALPITATIONS: 0
PSYCHIATRIC NEGATIVE: 1
NAUSEA: 0
RESPIRATORY NEGATIVE: 1
EYES NEGATIVE: 1
GASTROINTESTINAL NEGATIVE: 1
CHILLS: 0
DEPRESSION: 0
MYALGIAS: 0
TINGLING: 0
HEMOPTYSIS: 0
HEADACHES: 0
FEVER: 0

## 2019-10-28 NOTE — PROGRESS NOTES
Subjective:      Brent Bailey is a 53 y.o. female who presents with Pain (elbow)            1. Other bursitis of elbow, left elbow  Hit her left elbow and had a large bursitis that resolved mainly but still with some mild swelling in the area   Xray negative for fx  On exam, 6 by 6 mm bursa swelling still evident, will treat with steroids and compression sleeve  - methylPREDNISolone (MEDROL DOSEPAK) 4 MG Tablet Therapy Pack; As directed on the packaging label.  Dispense: 21 Tab; Refill: 0    2. Benign essential HTN  Currently treated for HTN, taking meds with no CP or sob, monitors bp at home periodically. controlled      3. Tobacco abuse  Patient is still currently using tobacco. They were counseled on the importance of cessation and various behavioural and pharmacological ways of achieving this.  UNCONTROLLED      Past Medical History:  No date: Hypertension  No past surgical history on file.  Social History    Tobacco Use      Smoking status: Former Smoker        Packs/day: 0.50        Years: 20.00        Pack years: 10        Types: Cigarettes        Quit date: 2018        Years since quittin.3      Smokeless tobacco: Never Used    Alcohol use: Yes      Comment: occassional wine    Drug use: No    No family history on file.      Current Outpatient Medications: •  methylPREDNISolone (MEDROL DOSEPAK) 4 MG Tablet Therapy Pack, As directed on the packaging label., Disp: 21 Tab, Rfl: 0•  triamterene-hctz (MAXZIDE-25/DYAZIDE) 37.5-25 MG Tab, Take 1 Tab by mouth every day., Disp: 90 Tab, Rfl: 1•  buPROPion (WELLBUTRIN XL) 150 MG XL tablet, Take 1 Tab by mouth every morning., Disp: 90 Tab, Rfl: 3•  amoxicillin (AMOXIL) 500 MG Cap, Take 1 Cap by mouth 3 times a day. (Patient not taking: Reported on 2019), Disp: 30 Cap, Rfl: 0•  benzonatate (TESSALON) 100 MG Cap, Take 1 Cap by mouth 3 times a day as needed for Cough. (Patient not taking: Reported on 2019), Disp: 60 Cap, Rfl: 0    Patient was  "instructed on the use of medications, either prescriptions or OTC and informed on when the appropriate follow up time period should be. In addition, patient was also instructed that should any acute worsening occur that they should notify this clinic asap or call 911.          Review of Systems   Constitutional: Negative.  Negative for chills and fever.   HENT: Negative.  Negative for hearing loss.    Eyes: Negative.  Negative for blurred vision and double vision.   Respiratory: Negative.  Negative for cough and hemoptysis.    Cardiovascular: Negative.  Negative for chest pain and palpitations.   Gastrointestinal: Negative.  Negative for heartburn and nausea.   Genitourinary: Negative.  Negative for dysuria.   Musculoskeletal: Positive for joint pain. Negative for myalgias.   Skin: Negative.  Negative for rash.   Neurological: Negative.  Negative for dizziness, tingling and headaches.   Endo/Heme/Allergies: Negative.  Does not bruise/bleed easily.   Psychiatric/Behavioral: Negative.  Negative for depression and suicidal ideas.   All other systems reviewed and are negative.         Objective:     /70   Pulse 74   Temp 36.2 °C (97.1 °F)   Resp 16   Ht 1.651 m (5' 5\")   Wt 60.9 kg (134 lb 4.8 oz)   SpO2 97%   BMI 22.35 kg/m²      Physical Exam   Constitutional: She is oriented to person, place, and time. She appears well-developed and well-nourished. No distress.   HENT:   Head: Normocephalic and atraumatic.   Mouth/Throat: Oropharynx is clear and moist. No oropharyngeal exudate.   Eyes: Pupils are equal, round, and reactive to light.   Cardiovascular: Normal rate, regular rhythm, normal heart sounds and intact distal pulses. Exam reveals no gallop and no friction rub.   No murmur heard.  Pulmonary/Chest: Effort normal and breath sounds normal. No respiratory distress. She has no wheezes. She has no rales. She exhibits no tenderness.   Musculoskeletal:        Right forearm: She exhibits tenderness and " swelling.   Neurological: She is alert and oriented to person, place, and time.   Skin: She is not diaphoretic.   Psychiatric: She has a normal mood and affect. Her behavior is normal. Judgment and thought content normal.   Nursing note and vitals reviewed.              Assessment/Plan:     1. Other bursitis of elbow, left elbow    - methylPREDNISolone (MEDROL DOSEPAK) 4 MG Tablet Therapy Pack; As directed on the packaging label.  Dispense: 21 Tab; Refill: 0    2. Benign essential HTN      3. Tobacco abuse

## 2019-12-24 DIAGNOSIS — I10 BENIGN ESSENTIAL HTN: ICD-10-CM

## 2019-12-24 RX ORDER — BUPROPION HYDROCHLORIDE 150 MG/1
150 TABLET ORAL EVERY MORNING
Qty: 90 TAB | Refills: 3 | Status: SHIPPED | OUTPATIENT
Start: 2019-12-24 | End: 2019-12-30 | Stop reason: SDUPTHER

## 2019-12-30 DIAGNOSIS — I10 BENIGN ESSENTIAL HTN: ICD-10-CM

## 2019-12-30 RX ORDER — TRIAMTERENE AND HYDROCHLOROTHIAZIDE 37.5; 25 MG/1; MG/1
1 TABLET ORAL DAILY
Qty: 90 TAB | Refills: 3 | Status: SHIPPED | OUTPATIENT
Start: 2019-12-30 | End: 2019-12-31 | Stop reason: SDUPTHER

## 2019-12-30 RX ORDER — BUPROPION HYDROCHLORIDE 150 MG/1
150 TABLET ORAL EVERY MORNING
Qty: 90 TAB | Refills: 3 | Status: SHIPPED | OUTPATIENT
Start: 2019-12-30 | End: 2021-01-04 | Stop reason: SDUPTHER

## 2019-12-31 ENCOUNTER — OFFICE VISIT (OUTPATIENT)
Dept: MEDICAL GROUP | Facility: PHYSICIAN GROUP | Age: 54
End: 2019-12-31
Payer: COMMERCIAL

## 2019-12-31 VITALS
HEIGHT: 64 IN | OXYGEN SATURATION: 97 % | WEIGHT: 132 LBS | BODY MASS INDEX: 22.53 KG/M2 | SYSTOLIC BLOOD PRESSURE: 110 MMHG | HEART RATE: 70 BPM | TEMPERATURE: 97.7 F | DIASTOLIC BLOOD PRESSURE: 74 MMHG

## 2019-12-31 DIAGNOSIS — Z72.0 TOBACCO ABUSE: ICD-10-CM

## 2019-12-31 DIAGNOSIS — I10 BENIGN ESSENTIAL HTN: ICD-10-CM

## 2019-12-31 DIAGNOSIS — M70.32 OTHER BURSITIS OF ELBOW, LEFT ELBOW: ICD-10-CM

## 2019-12-31 PROCEDURE — 99214 OFFICE O/P EST MOD 30 MIN: CPT | Mod: 25 | Performed by: FAMILY MEDICINE

## 2019-12-31 PROCEDURE — 20605 DRAIN/INJ JOINT/BURSA W/O US: CPT | Performed by: FAMILY MEDICINE

## 2019-12-31 RX ORDER — TRIAMTERENE AND HYDROCHLOROTHIAZIDE 37.5; 25 MG/1; MG/1
1 TABLET ORAL DAILY
Qty: 90 TAB | Refills: 3 | Status: SHIPPED | OUTPATIENT
Start: 2019-12-31 | End: 2021-01-04 | Stop reason: SDUPTHER

## 2019-12-31 ASSESSMENT — ENCOUNTER SYMPTOMS
GASTROINTESTINAL NEGATIVE: 1
RESPIRATORY NEGATIVE: 1
DOUBLE VISION: 0
TINGLING: 0
MYALGIAS: 0
PSYCHIATRIC NEGATIVE: 1
COUGH: 0
NEUROLOGICAL NEGATIVE: 1
DIZZINESS: 0
HEADACHES: 0
PALPITATIONS: 0
HEMOPTYSIS: 0
FEVER: 0
CONSTITUTIONAL NEGATIVE: 1
NAUSEA: 0
BRUISES/BLEEDS EASILY: 0
DEPRESSION: 0
BLURRED VISION: 0
HEARTBURN: 0
CHILLS: 0
CARDIOVASCULAR NEGATIVE: 1
EYES NEGATIVE: 1

## 2020-01-01 NOTE — PROGRESS NOTES
Subjective:      Brent Bailey is a 54 y.o. female who presents with Medication Refill ([facility] medication refill  on Bupropion and triamterene-hctz. ) and Elbow Pain (Pt reports left elbow pain and had discussed possibly getting a Cortisone injection.)            1. Benign essential HTN  Currently treated for HTN, taking meds with no CP or sob, monitors bp at home periodically. controlled    - triamterene-hctz (MAXZIDE-25/DYAZIDE) 37.5-25 MG Tab; Take 1 Tab by mouth every day.  Dispense: 90 Tab; Refill: 3    2. Other bursitis of elbow, left elbow  Not better with po steroids  On exam 5 mm by 5mm tender bursa sac still present  After consent  Area prepped with alcohol and 40 mg of kenalog and 1 ml of 1% lidocaine injected into area with no complications  Advised on RICE  If not resolved fully with this will send to ortho for removal of bursal sac    3. Tobacco abuse  Patient is still currently using tobacco. They were counseled on the importance of cessation and various behavioural and pharmacological ways of achieving this.  UNCONTROLLED      Past Medical History:  No date: Hypertension  History reviewed. No pertinent surgical history.  Social History    Tobacco Use      Smoking status: Former Smoker        Packs/day: 0.50        Years: 20.00        Pack years: 10        Types: Cigarettes        Quit date: 2018        Years since quittin.5      Smokeless tobacco: Never Used    Alcohol use: Yes      Comment: occassional wine    Drug use: No    History reviewed.  No pertinent family history.      Current Outpatient Medications: •  triamterene-hctz (MAXZIDE-25/DYAZIDE) 37.5-25 MG Tab, Take 1 Tab by mouth every day., Disp: 90 Tab, Rfl: 3•  buPROPion (WELLBUTRIN XL) 150 MG XL tablet, Take 1 Tab by mouth every morning., Disp: 90 Tab, Rfl: 3•  methylPREDNISolone (MEDROL DOSEPAK) 4 MG Tablet Therapy Pack, As directed on the packaging label. (Patient not taking: Reported on 2019), Disp: 21 Tab, Rfl: 0•   "amoxicillin (AMOXIL) 500 MG Cap, Take 1 Cap by mouth 3 times a day. (Patient not taking: Reported on 6/13/2019), Disp: 30 Cap, Rfl: 0•  benzonatate (TESSALON) 100 MG Cap, Take 1 Cap by mouth 3 times a day as needed for Cough. (Patient not taking: Reported on 6/13/2019), Disp: 60 Cap, Rfl: 0    Patient was instructed on the use of medications, either prescriptions or OTC and informed on when the appropriate follow up time period should be. In addition, patient was also instructed that should any acute worsening occur that they should notify this clinic asap or call 911.          Review of Systems   Constitutional: Negative.  Negative for chills and fever.   HENT: Negative.  Negative for hearing loss.    Eyes: Negative.  Negative for blurred vision and double vision.   Respiratory: Negative.  Negative for cough and hemoptysis.    Cardiovascular: Negative.  Negative for chest pain and palpitations.   Gastrointestinal: Negative.  Negative for heartburn and nausea.   Genitourinary: Negative.  Negative for dysuria.   Musculoskeletal: Positive for joint pain. Negative for myalgias.   Skin: Negative.  Negative for rash.   Neurological: Negative.  Negative for dizziness, tingling and headaches.   Endo/Heme/Allergies: Negative.  Does not bruise/bleed easily.   Psychiatric/Behavioral: Negative.  Negative for depression and suicidal ideas.   All other systems reviewed and are negative.         Objective:     /74 (BP Location: Right arm, Patient Position: Sitting, BP Cuff Size: Adult)   Pulse 70   Temp 36.5 °C (97.7 °F) (Temporal)   Ht 1.613 m (5' 3.5\")   Wt 59.9 kg (132 lb)   SpO2 97%   BMI 23.02 kg/m²      Physical Exam  Vitals signs and nursing note reviewed.   Constitutional:       General: She is not in acute distress.     Appearance: She is well-developed. She is not diaphoretic.   HENT:      Head: Normocephalic and atraumatic.      Mouth/Throat:      Pharynx: No oropharyngeal exudate.   Eyes:      Pupils: " Pupils are equal, round, and reactive to light.   Cardiovascular:      Rate and Rhythm: Normal rate and regular rhythm.      Heart sounds: Normal heart sounds. No murmur. No friction rub. No gallop.    Pulmonary:      Effort: Pulmonary effort is normal. No respiratory distress.      Breath sounds: Normal breath sounds. No wheezing or rales.   Chest:      Chest wall: No tenderness.   Musculoskeletal:      Left elbow: She exhibits effusion. She exhibits normal range of motion.   Neurological:      Mental Status: She is alert and oriented to person, place, and time.   Psychiatric:         Behavior: Behavior normal.         Thought Content: Thought content normal.         Judgment: Judgment normal.                 Assessment/Plan:       1. Benign essential HTN    - triamterene-hctz (MAXZIDE-25/DYAZIDE) 37.5-25 MG Tab; Take 1 Tab by mouth every day.  Dispense: 90 Tab; Refill: 3    2. Other bursitis of elbow, left elbow      3. Tobacco abuse

## 2020-01-13 ENCOUNTER — TELEPHONE (OUTPATIENT)
Dept: MEDICAL GROUP | Facility: PHYSICIAN GROUP | Age: 55
End: 2020-01-13

## 2020-01-13 NOTE — TELEPHONE ENCOUNTER
Pt left voicemail on 1/12/2020 about refills on medications. I spoke with pharmacy in regards to medications and they stated that they do have both the triamterene and the buproprion and that there was an issue with the insurance end. Per pharmacy they will get this ready for patient. LVM for pt to let her know.

## 2021-01-04 ENCOUNTER — OFFICE VISIT (OUTPATIENT)
Dept: MEDICAL GROUP | Facility: PHYSICIAN GROUP | Age: 56
End: 2021-01-04

## 2021-01-04 VITALS
BODY MASS INDEX: 23.35 KG/M2 | DIASTOLIC BLOOD PRESSURE: 70 MMHG | OXYGEN SATURATION: 97 % | HEIGHT: 64 IN | WEIGHT: 136.8 LBS | RESPIRATION RATE: 14 BRPM | HEART RATE: 87 BPM | SYSTOLIC BLOOD PRESSURE: 122 MMHG | TEMPERATURE: 98.3 F

## 2021-01-04 DIAGNOSIS — I10 BENIGN ESSENTIAL HTN: ICD-10-CM

## 2021-01-04 DIAGNOSIS — Z12.31 ENCOUNTER FOR SCREENING MAMMOGRAM FOR BREAST CANCER: ICD-10-CM

## 2021-01-04 PROCEDURE — 99214 OFFICE O/P EST MOD 30 MIN: CPT | Performed by: FAMILY MEDICINE

## 2021-01-04 RX ORDER — TRIAMTERENE AND HYDROCHLOROTHIAZIDE 37.5; 25 MG/1; MG/1
1 TABLET ORAL DAILY
Qty: 90 TAB | Refills: 3 | Status: SHIPPED | OUTPATIENT
Start: 2021-01-04 | End: 2021-12-06 | Stop reason: SDUPTHER

## 2021-01-04 RX ORDER — BUPROPION HYDROCHLORIDE 150 MG/1
150 TABLET ORAL EVERY MORNING
Qty: 90 TAB | Refills: 3 | Status: SHIPPED | OUTPATIENT
Start: 2021-01-04 | End: 2021-12-06 | Stop reason: SDUPTHER

## 2021-01-04 ASSESSMENT — ENCOUNTER SYMPTOMS
EYES NEGATIVE: 1
NAUSEA: 0
NEUROLOGICAL NEGATIVE: 1
TINGLING: 0
HEARTBURN: 0
CARDIOVASCULAR NEGATIVE: 1
CHILLS: 0
COUGH: 0
MYALGIAS: 0
FEVER: 0
DEPRESSION: 0
PALPITATIONS: 0
HEMOPTYSIS: 0
DIZZINESS: 0
GASTROINTESTINAL NEGATIVE: 1
HEADACHES: 0
CONSTITUTIONAL NEGATIVE: 1
DOUBLE VISION: 0
MUSCULOSKELETAL NEGATIVE: 1
RESPIRATORY NEGATIVE: 1
PSYCHIATRIC NEGATIVE: 1
BRUISES/BLEEDS EASILY: 0
BLURRED VISION: 0

## 2021-01-04 ASSESSMENT — PATIENT HEALTH QUESTIONNAIRE - PHQ9: CLINICAL INTERPRETATION OF PHQ2 SCORE: 0

## 2021-01-05 NOTE — PROGRESS NOTES
Subjective:      Brent Bailey is a 55 y.o. female who presents with Hypertension (med refill) and Hand Pain (right thumb pain)            1. Benign essential HTN  Currently treated for HTN, taking meds with no CP or sob, monitors bp at home periodically. controlled    - triamterene-hctz (MAXZIDE-25/DYAZIDE) 37.5-25 MG Tab; Take 1 Tab by mouth every day.  Dispense: 90 Tab; Refill: 3  - buPROPion (WELLBUTRIN XL) 150 MG XL tablet; Take 1 Tab by mouth every morning.  Dispense: 90 Tab; Refill: 3    2. Encounter for screening mammogram for breast cancer    - MA-SCREENING MAMMO BILAT W/CAD; Future    Past Medical History:  No date: Hypertension  No past surgical history on file.  Social History    Tobacco Use      Smoking status: Former Smoker        Packs/day: 0.50        Years: 20.00        Pack years: 10        Types: Cigarettes        Quit date: 2018        Years since quittin.5      Smokeless tobacco: Never Used    Alcohol use: Yes      Comment: occassional wine    Drug use: No    No family history on file.      Current Outpatient Medications: •  triamterene-hctz (MAXZIDE-25/DYAZIDE) 37.5-25 MG Tab, Take 1 Tab by mouth every day., Disp: 90 Tab, Rfl: 3•  buPROPion (WELLBUTRIN XL) 150 MG XL tablet, Take 1 Tab by mouth every morning., Disp: 90 Tab, Rfl: 3•  methylPREDNISolone (MEDROL DOSEPAK) 4 MG Tablet Therapy Pack, As directed on the packaging label. (Patient not taking: Reported on 2019), Disp: 21 Tab, Rfl: 0•  amoxicillin (AMOXIL) 500 MG Cap, Take 1 Cap by mouth 3 times a day. (Patient not taking: Reported on 2019), Disp: 30 Cap, Rfl: 0•  benzonatate (TESSALON) 100 MG Cap, Take 1 Cap by mouth 3 times a day as needed for Cough. (Patient not taking: Reported on 2019), Disp: 60 Cap, Rfl: 0    Patient was instructed on the use of medications, either prescriptions or OTC and informed on when the appropriate follow up time period should be. In addition, patient was also instructed that should  "any acute worsening occur that they should notify this clinic asap or call 911.          Review of Systems   Constitutional: Negative.  Negative for chills and fever.   HENT: Negative.  Negative for hearing loss.    Eyes: Negative.  Negative for blurred vision and double vision.   Respiratory: Negative.  Negative for cough and hemoptysis.    Cardiovascular: Negative.  Negative for chest pain and palpitations.   Gastrointestinal: Negative.  Negative for heartburn and nausea.   Genitourinary: Negative.  Negative for dysuria.   Musculoskeletal: Negative.  Negative for myalgias.   Skin: Negative.  Negative for rash.   Neurological: Negative.  Negative for dizziness, tingling and headaches.   Endo/Heme/Allergies: Negative.  Does not bruise/bleed easily.   Psychiatric/Behavioral: Negative.  Negative for depression and suicidal ideas.   All other systems reviewed and are negative.         Objective:     /70 (BP Location: Left arm, Patient Position: Sitting, BP Cuff Size: Adult)   Pulse 87   Temp 36.8 °C (98.3 °F)   Resp 14   Ht 1.626 m (5' 4\")   Wt 62.1 kg (136 lb 12.8 oz)   SpO2 97%   BMI 23.48 kg/m²      Physical Exam  Vitals signs and nursing note reviewed.   Constitutional:       General: She is not in acute distress.     Appearance: She is well-developed. She is not diaphoretic.   HENT:      Head: Normocephalic and atraumatic.      Mouth/Throat:      Pharynx: No oropharyngeal exudate.   Eyes:      Pupils: Pupils are equal, round, and reactive to light.   Cardiovascular:      Rate and Rhythm: Normal rate and regular rhythm.      Heart sounds: Normal heart sounds. No murmur. No friction rub. No gallop.    Pulmonary:      Effort: Pulmonary effort is normal. No respiratory distress.      Breath sounds: Normal breath sounds. No wheezing or rales.   Chest:      Chest wall: No tenderness.   Neurological:      Mental Status: She is alert and oriented to person, place, and time.   Psychiatric:         Behavior: " Behavior normal.         Thought Content: Thought content normal.         Judgment: Judgment normal.                 Assessment/Plan:        1. Benign essential HTN    - triamterene-hctz (MAXZIDE-25/DYAZIDE) 37.5-25 MG Tab; Take 1 Tab by mouth every day.  Dispense: 90 Tab; Refill: 3  - buPROPion (WELLBUTRIN XL) 150 MG XL tablet; Take 1 Tab by mouth every morning.  Dispense: 90 Tab; Refill: 3    2. Encounter for screening mammogram for breast cancer    - MA-SCREENING MAMMO BILAT W/CAD; Future

## 2021-12-06 ENCOUNTER — OFFICE VISIT (OUTPATIENT)
Dept: MEDICAL GROUP | Facility: PHYSICIAN GROUP | Age: 56
End: 2021-12-06

## 2021-12-06 VITALS
OXYGEN SATURATION: 95 % | TEMPERATURE: 97.3 F | BODY MASS INDEX: 23.12 KG/M2 | RESPIRATION RATE: 14 BRPM | HEIGHT: 64 IN | WEIGHT: 135.4 LBS | HEART RATE: 88 BPM | DIASTOLIC BLOOD PRESSURE: 74 MMHG | SYSTOLIC BLOOD PRESSURE: 114 MMHG

## 2021-12-06 DIAGNOSIS — Z12.31 ENCOUNTER FOR SCREENING MAMMOGRAM FOR BREAST CANCER: ICD-10-CM

## 2021-12-06 DIAGNOSIS — I10 BENIGN ESSENTIAL HTN: ICD-10-CM

## 2021-12-06 PROCEDURE — 99213 OFFICE O/P EST LOW 20 MIN: CPT | Performed by: FAMILY MEDICINE

## 2021-12-06 RX ORDER — BUPROPION HYDROCHLORIDE 150 MG/1
150 TABLET ORAL EVERY MORNING
Qty: 90 TABLET | Refills: 3 | Status: SHIPPED | OUTPATIENT
Start: 2021-12-06 | End: 2022-12-05 | Stop reason: SDUPTHER

## 2021-12-06 RX ORDER — TRIAMTERENE AND HYDROCHLOROTHIAZIDE 37.5; 25 MG/1; MG/1
1 TABLET ORAL DAILY
Qty: 90 TABLET | Refills: 3 | Status: SHIPPED | OUTPATIENT
Start: 2021-12-06 | End: 2022-12-05 | Stop reason: SDUPTHER

## 2021-12-06 ASSESSMENT — ENCOUNTER SYMPTOMS
TINGLING: 0
RESPIRATORY NEGATIVE: 1
HEARTBURN: 0
PSYCHIATRIC NEGATIVE: 1
CONSTITUTIONAL NEGATIVE: 1
FEVER: 0
NAUSEA: 0
EYES NEGATIVE: 1
DIZZINESS: 0
PALPITATIONS: 0
BRUISES/BLEEDS EASILY: 0
MYALGIAS: 0
NEUROLOGICAL NEGATIVE: 1
CARDIOVASCULAR NEGATIVE: 1
HEMOPTYSIS: 0
DOUBLE VISION: 0
MUSCULOSKELETAL NEGATIVE: 1
BLURRED VISION: 0
CHILLS: 0
COUGH: 0
GASTROINTESTINAL NEGATIVE: 1
DEPRESSION: 0
HEADACHES: 0

## 2021-12-06 NOTE — PROGRESS NOTES
Subjective     Brent Bailey is a 55 y.o. female who presents with Medication Refill (buPROPion, triamterene)            1. Benign essential HTN  Currently treated for HTN, taking meds with no CP or sob, monitors bp at home periodically. controlled     buPROPion (WELLBUTRIN XL) 150 MG XL tablet; Take 1 Tablet by mouth every morning.  Dispense: 90 Tablet; Refill: 3   triamterene-hctz (MAXZIDE-25/DYAZIDE) 37.5-25 MG Tab; Take 1 Tablet by mouth every day.  Dispense: 90 Tablet; Refill: 3    2. Encounter for screening mammogram for breast cancer     MA-SCREENING MAMMO BILAT W/CAD; Future    Past Medical History:  No date: Hypertension  History reviewed. No pertinent surgical history.  Social History    Tobacco Use      Smoking status: Former Smoker        Packs/day: 0.50        Years: 20.00        Pack years: 10        Types: Cigarettes        Quit date: 6/11/2018        Years since quitting: 3.4      Smokeless tobacco: Never Used    Vaping Use      Vaping Use: Never used    Alcohol use: Yes      Comment: occassional wine    Drug use: No    History reviewed.  No pertinent family history.      Current Outpatient Medications: •  buPROPion (WELLBUTRIN XL) 150 MG XL tablet, Take 1 Tablet by mouth every morning., Disp: 90 Tablet, Rfl: 3•  triamterene-hctz (MAXZIDE-25/DYAZIDE) 37.5-25 MG Tab, Take 1 Tablet by mouth every day., Disp: 90 Tablet, Rfl: 3    Patient was instructed on the use of medications, either prescriptions or OTC and informed on when the appropriate follow up time period should be. In addition, patient was also instructed that should any acute worsening occur that they should notify this clinic asap or call 911.          Review of Systems   Constitutional: Negative.  Negative for chills and fever.   HENT: Negative.  Negative for hearing loss.    Eyes: Negative.  Negative for blurred vision and double vision.   Respiratory: Negative.  Negative for cough and hemoptysis.    Cardiovascular: Negative.  Negative  "for chest pain and palpitations.   Gastrointestinal: Negative.  Negative for heartburn and nausea.   Genitourinary: Negative.  Negative for dysuria.   Musculoskeletal: Negative.  Negative for myalgias.   Skin: Negative.  Negative for rash.   Neurological: Negative.  Negative for dizziness, tingling and headaches.   Endo/Heme/Allergies: Negative.  Does not bruise/bleed easily.   Psychiatric/Behavioral: Negative.  Negative for depression and suicidal ideas.   All other systems reviewed and are negative.             Objective     /74   Pulse 88   Temp 36.3 °C (97.3 °F) (Temporal)   Resp 14   Ht 1.626 m (5' 4\")   Wt 61.4 kg (135 lb 6.4 oz)   SpO2 95%   BMI 23.24 kg/m²      Physical Exam  Vitals and nursing note reviewed.   Constitutional:       General: She is not in acute distress.     Appearance: She is well-developed. She is not diaphoretic.   HENT:      Head: Normocephalic and atraumatic.      Mouth/Throat:      Pharynx: No oropharyngeal exudate.   Eyes:      Pupils: Pupils are equal, round, and reactive to light.   Cardiovascular:      Rate and Rhythm: Normal rate and regular rhythm.      Heart sounds: Normal heart sounds. No murmur heard.  No friction rub. No gallop.    Pulmonary:      Effort: Pulmonary effort is normal. No respiratory distress.      Breath sounds: Normal breath sounds. No wheezing or rales.   Chest:      Chest wall: No tenderness.   Neurological:      Mental Status: She is alert and oriented to person, place, and time.   Psychiatric:         Behavior: Behavior normal.         Thought Content: Thought content normal.         Judgment: Judgment normal.                             Assessment & Plan        1. Benign essential HTN    - buPROPion (WELLBUTRIN XL) 150 MG XL tablet; Take 1 Tablet by mouth every morning.  Dispense: 90 Tablet; Refill: 3  - triamterene-hctz (MAXZIDE-25/DYAZIDE) 37.5-25 MG Tab; Take 1 Tablet by mouth every day.  Dispense: 90 Tablet; Refill: 3    2. Encounter for " screening mammogram for breast cancer    - MA-SCREENING MAMMO BILAT W/CAD; Future                 Ambulatory

## 2022-12-05 ENCOUNTER — OFFICE VISIT (OUTPATIENT)
Dept: MEDICAL GROUP | Facility: PHYSICIAN GROUP | Age: 57
End: 2022-12-05

## 2022-12-05 VITALS
HEIGHT: 64 IN | DIASTOLIC BLOOD PRESSURE: 74 MMHG | SYSTOLIC BLOOD PRESSURE: 114 MMHG | TEMPERATURE: 97.1 F | WEIGHT: 133 LBS | OXYGEN SATURATION: 96 % | HEART RATE: 94 BPM | BODY MASS INDEX: 22.71 KG/M2 | RESPIRATION RATE: 12 BRPM

## 2022-12-05 DIAGNOSIS — Z00.00 WELL ADULT EXAM: ICD-10-CM

## 2022-12-05 DIAGNOSIS — Z12.31 ENCOUNTER FOR SCREENING MAMMOGRAM FOR BREAST CANCER: ICD-10-CM

## 2022-12-05 DIAGNOSIS — I10 BENIGN ESSENTIAL HTN: ICD-10-CM

## 2022-12-05 PROCEDURE — 99396 PREV VISIT EST AGE 40-64: CPT | Performed by: FAMILY MEDICINE

## 2022-12-05 RX ORDER — BUPROPION HYDROCHLORIDE 150 MG/1
150 TABLET ORAL EVERY MORNING
Qty: 90 TABLET | Refills: 3 | Status: SHIPPED | OUTPATIENT
Start: 2022-12-05 | End: 2024-02-20

## 2022-12-05 RX ORDER — TRIAMTERENE AND HYDROCHLOROTHIAZIDE 37.5; 25 MG/1; MG/1
1 TABLET ORAL DAILY
Qty: 90 TABLET | Refills: 3 | Status: SHIPPED | OUTPATIENT
Start: 2022-12-05 | End: 2024-02-20

## 2022-12-05 ASSESSMENT — ENCOUNTER SYMPTOMS
HEMOPTYSIS: 0
DOUBLE VISION: 0
MYALGIAS: 0
TINGLING: 0
COUGH: 0
FEVER: 0
CHILLS: 0
CONSTITUTIONAL NEGATIVE: 1
BLURRED VISION: 0
DIZZINESS: 0
MUSCULOSKELETAL NEGATIVE: 1
EYES NEGATIVE: 1
NAUSEA: 0
BRUISES/BLEEDS EASILY: 0
PSYCHIATRIC NEGATIVE: 1
NEUROLOGICAL NEGATIVE: 1
HEARTBURN: 0
GASTROINTESTINAL NEGATIVE: 1
HEADACHES: 0
CARDIOVASCULAR NEGATIVE: 1
RESPIRATORY NEGATIVE: 1
PALPITATIONS: 0
DEPRESSION: 0

## 2022-12-05 ASSESSMENT — PATIENT HEALTH QUESTIONNAIRE - PHQ9: CLINICAL INTERPRETATION OF PHQ2 SCORE: 0

## 2022-12-05 NOTE — PROGRESS NOTES
Subjective     Brent Bailey is a 56 y.o. female who presents with Annual Exam and Medication Refill            1. Well adult exam  This patient was here for a preventative medicine visit today. The Health Maintenance issues that are appropriate for this patient's age and sex were discussed and any that they agreed to were ordered. The patient was also give age and sex appropriate counseling for preventative matters such as diet, exercise, medication usage, and social determinants.       FREE THYROXINE; Future   Comp Metabolic Panel; Future   Lipid Profile; Future   TRIIDOTHYRONINE; Future    2. Benign essential HTN  Currently treated for HTN, taking meds with no CP or sob, monitors bp at home periodically. controlled       triamterene-hctz (MAXZIDE-25/DYAZIDE) 37.5-25 MG Tab; Take 1 Tablet by mouth every day.  Dispense: 90 Tablet; Refill: 3   buPROPion (WELLBUTRIN XL) 150 MG XL tablet; Take 1 Tablet by mouth every morning.  Dispense: 90 Tablet; Refill: 3   FREE THYROXINE; Future   Comp Metabolic Panel; Future   Lipid Profile; Future   TRIIDOTHYRONINE; Future    3. Encounter for screening mammogram for breast cancer     MA-SCREENING MAMMO BILAT W/TOMOSYNTHESIS W/CAD; Future   FREE THYROXINE; Future   Comp Metabolic Panel; Future   Lipid Profile; Future   TRIIDOTHYRONINE; Future    Past Medical History:  No date: Hypertension  No past surgical history on file.  Social History    Tobacco Use      Smoking status: Former        Packs/day: 0.50        Years: 20.00        Pack years: 10        Types: Cigarettes        Quit date: 2018        Years since quittin.4      Smokeless tobacco: Never    Vaping Use      Vaping Use: Never used    Alcohol use: Yes      Comment: occassional wine    Drug use: No    No family history on file.      Current Outpatient Medications: ·  triamterene-hctz (MAXZIDE-25/DYAZIDE) 37.5-25 MG Tab, Take 1 Tablet by mouth every day., Disp: 90 Tablet, Rfl: 3·  buPROPion (WELLBUTRIN XL) 150 MG  "XL tablet, Take 1 Tablet by mouth every morning., Disp: 90 Tablet, Rfl: 3    Patient was instructed on the use of medications, either prescriptions or OTC and informed on when the appropriate follow up time period should be. In addition, patient was also instructed that should any acute worsening occur that they should notify this clinic asap or call 911.            Review of Systems   Constitutional: Negative.  Negative for chills and fever.   HENT: Negative.  Negative for hearing loss.    Eyes: Negative.  Negative for blurred vision and double vision.   Respiratory: Negative.  Negative for cough and hemoptysis.    Cardiovascular: Negative.  Negative for chest pain and palpitations.   Gastrointestinal: Negative.  Negative for heartburn and nausea.   Genitourinary: Negative.  Negative for dysuria.   Musculoskeletal: Negative.  Negative for myalgias.   Skin: Negative.  Negative for rash.   Neurological: Negative.  Negative for dizziness, tingling and headaches.   Endo/Heme/Allergies: Negative.  Does not bruise/bleed easily.   Psychiatric/Behavioral: Negative.  Negative for depression and suicidal ideas.    All other systems reviewed and are negative.           Objective     /74 (BP Location: Left arm, Patient Position: Sitting, BP Cuff Size: Adult)   Pulse 94   Temp 36.2 °C (97.1 °F) (Temporal)   Resp 12   Ht 1.626 m (5' 4\")   Wt 60.3 kg (133 lb)   SpO2 96%   BMI 22.83 kg/m²      Physical Exam  Vitals and nursing note reviewed.   Constitutional:       General: She is not in acute distress.     Appearance: She is well-developed. She is not diaphoretic.   HENT:      Head: Normocephalic and atraumatic.      Mouth/Throat:      Pharynx: No oropharyngeal exudate.   Eyes:      Pupils: Pupils are equal, round, and reactive to light.   Cardiovascular:      Rate and Rhythm: Normal rate and regular rhythm.      Heart sounds: Normal heart sounds. No murmur heard.    No friction rub. No gallop.   Pulmonary:      " Effort: Pulmonary effort is normal. No respiratory distress.      Breath sounds: Normal breath sounds. No wheezing or rales.   Chest:      Chest wall: No tenderness.   Neurological:      Mental Status: She is alert and oriented to person, place, and time.   Psychiatric:         Behavior: Behavior normal.         Thought Content: Thought content normal.         Judgment: Judgment normal.                           Assessment & Plan        1. Well adult exam    - FREE THYROXINE; Future  - Comp Metabolic Panel; Future  - Lipid Profile; Future  - TRIIDOTHYRONINE; Future    2. Benign essential HTN    - triamterene-hctz (MAXZIDE-25/DYAZIDE) 37.5-25 MG Tab; Take 1 Tablet by mouth every day.  Dispense: 90 Tablet; Refill: 3  - buPROPion (WELLBUTRIN XL) 150 MG XL tablet; Take 1 Tablet by mouth every morning.  Dispense: 90 Tablet; Refill: 3  - FREE THYROXINE; Future  - Comp Metabolic Panel; Future  - Lipid Profile; Future  - TRIIDOTHYRONINE; Future    3. Encounter for screening mammogram for breast cancer    - MA-SCREENING MAMMO BILAT W/TOMOSYNTHESIS W/CAD; Future  - FREE THYROXINE; Future  - Comp Metabolic Panel; Future  - Lipid Profile; Future  - TRIIDOTHYRONINE; Future

## 2024-02-19 DIAGNOSIS — I10 BENIGN ESSENTIAL HTN: ICD-10-CM

## 2024-02-20 RX ORDER — BUPROPION HYDROCHLORIDE 150 MG/1
150 TABLET ORAL EVERY MORNING
Qty: 90 TABLET | Refills: 3 | Status: SHIPPED | OUTPATIENT
Start: 2024-02-20 | End: 2024-02-28 | Stop reason: SDUPTHER

## 2024-02-20 RX ORDER — TRIAMTERENE AND HYDROCHLOROTHIAZIDE 37.5; 25 MG/1; MG/1
1 TABLET ORAL DAILY
Qty: 90 TABLET | Refills: 3 | Status: SHIPPED | OUTPATIENT
Start: 2024-02-20 | End: 2024-02-28 | Stop reason: SDUPTHER

## 2024-02-20 NOTE — TELEPHONE ENCOUNTER
Received request via: Pharmacy    Was the patient seen in the last year in this department? No    Does the patient have an active prescription (recently filled or refills available) for medication(s) requested? No    Does the patient have long term Plus and need 100 day supply (blood pressure, diabetes and cholesterol meds only)? Patient does not have SCP

## 2024-02-28 ENCOUNTER — OFFICE VISIT (OUTPATIENT)
Dept: MEDICAL GROUP | Facility: PHYSICIAN GROUP | Age: 59
End: 2024-02-28
Payer: COMMERCIAL

## 2024-02-28 VITALS
TEMPERATURE: 97.8 F | BODY MASS INDEX: 23.14 KG/M2 | SYSTOLIC BLOOD PRESSURE: 128 MMHG | OXYGEN SATURATION: 98 % | HEART RATE: 87 BPM | DIASTOLIC BLOOD PRESSURE: 82 MMHG | WEIGHT: 130.6 LBS | RESPIRATION RATE: 16 BRPM | HEIGHT: 63 IN

## 2024-02-28 DIAGNOSIS — Z12.31 ENCOUNTER FOR SCREENING MAMMOGRAM FOR BREAST CANCER: ICD-10-CM

## 2024-02-28 DIAGNOSIS — Z00.00 WELL ADULT EXAM: ICD-10-CM

## 2024-02-28 DIAGNOSIS — Z12.11 COLON CANCER SCREENING: ICD-10-CM

## 2024-02-28 DIAGNOSIS — I10 BENIGN ESSENTIAL HTN: ICD-10-CM

## 2024-02-28 PROCEDURE — 3079F DIAST BP 80-89 MM HG: CPT | Performed by: FAMILY MEDICINE

## 2024-02-28 PROCEDURE — 3074F SYST BP LT 130 MM HG: CPT | Performed by: FAMILY MEDICINE

## 2024-02-28 PROCEDURE — 99396 PREV VISIT EST AGE 40-64: CPT | Performed by: FAMILY MEDICINE

## 2024-02-28 RX ORDER — TRIAMTERENE AND HYDROCHLOROTHIAZIDE 37.5; 25 MG/1; MG/1
1 TABLET ORAL DAILY
Qty: 90 TABLET | Refills: 3 | Status: SHIPPED | OUTPATIENT
Start: 2024-02-28

## 2024-02-28 RX ORDER — BUPROPION HYDROCHLORIDE 150 MG/1
150 TABLET ORAL EVERY MORNING
Qty: 90 TABLET | Refills: 3 | Status: SHIPPED | OUTPATIENT
Start: 2024-02-28

## 2024-02-28 ASSESSMENT — ENCOUNTER SYMPTOMS
TINGLING: 0
FEVER: 0
MYALGIAS: 0
CHILLS: 0
COUGH: 0
PSYCHIATRIC NEGATIVE: 1
HEMOPTYSIS: 0
CARDIOVASCULAR NEGATIVE: 1
BLURRED VISION: 0
BRUISES/BLEEDS EASILY: 0
DIZZINESS: 0
NAUSEA: 0
CONSTITUTIONAL NEGATIVE: 1
GASTROINTESTINAL NEGATIVE: 1
HEARTBURN: 0
PALPITATIONS: 0
DEPRESSION: 0
RESPIRATORY NEGATIVE: 1
NEUROLOGICAL NEGATIVE: 1
DOUBLE VISION: 0
EYES NEGATIVE: 1
HEADACHES: 0
MUSCULOSKELETAL NEGATIVE: 1

## 2024-02-28 NOTE — PROGRESS NOTES
Subjective     Brent Bailey is a 58 y.o. female who presents with Annual Exam (Follow up patient requesting for refill on Bupropion and triamterene- hctz )            This patient was here for a preventative medicine visit today. The Health Maintenance issues that are appropriate for this patient's age and sex were discussed and any that they agreed to were ordered. The patient was also give age and sex appropriate counseling for preventative matters such as diet, exercise, medication usage, and social determinants.    1. Benign essential HTN     buPROPion (WELLBUTRIN XL) 150 MG XL tablet; Take 1 Tablet by mouth every morning.  Dispense: 90 Tablet; Refill: 3   triamterene-hctz (MAXZIDE-25/DYAZIDE) 37.5-25 MG Tab; Take 1 Tablet by mouth every day.  Dispense: 90 Tablet; Refill: 3   Lipid Profile; Future   FREE THYROXINE; Future   TRIIDOTHYRONINE; Future   TSH; Future   CBC WITHOUT DIFFERENTIAL; Future   Comp Metabolic Panel; Future    2. Encounter for screening mammogram for breast cancer     MA-SCREENING MAMMO BILAT W/TOMOSYNTHESIS W/CAD; Future   Lipid Profile; Future   FREE THYROXINE; Future   TRIIDOTHYRONINE; Future   TSH; Future   CBC WITHOUT DIFFERENTIAL; Future   Comp Metabolic Panel; Future    3. Colon cancer screening     Referral to Gastroenterology   Lipid Profile; Future   FREE THYROXINE; Future   TRIIDOTHYRONINE; Future   TSH; Future   CBC WITHOUT DIFFERENTIAL; Future   Comp Metabolic Panel; Future    4. Well adult exam   Lipid Profile; Future   FREE THYROXINE; Future   TRIIDOTHYRONINE; Future   TSH; Future   CBC WITHOUT DIFFERENTIAL; Future   Comp Metabolic Panel; Future    Past Medical History:  No date: Hypertension  No past surgical history on file.  Social History    Tobacco Use      Smoking status: Former        Packs/day: 0.00        Years: 0.5 packs/day for 20.0 years (10.0 ttl pk-yrs)        Types: Cigarettes        Start date: 6/11/1998        Quit date: 6/11/2018        Years since quitting:  "5.7      Smokeless tobacco: Never    Vaping Use      Vaping Use: Never used    Alcohol use: Yes      Comment: occassional wine    Drug use: No    No family history on file.      Current Outpatient Medications: ·  buPROPion (WELLBUTRIN XL) 150 MG XL tablet, Take 1 Tablet by mouth every morning., Disp: 90 Tablet, Rfl: 3·  triamterene-hctz (MAXZIDE-25/DYAZIDE) 37.5-25 MG Tab, Take 1 Tablet by mouth every day., Disp: 90 Tablet, Rfl: 3    Patient was instructed on the use of medications, either prescriptions or OTC and informed on when the appropriate follow up time period should be. In addition, patient was also instructed that should any acute worsening occur that they should notify this clinic asap or call 911.            Review of Systems   Constitutional: Negative.  Negative for chills and fever.   HENT: Negative.  Negative for hearing loss.    Eyes: Negative.  Negative for blurred vision and double vision.   Respiratory: Negative.  Negative for cough and hemoptysis.    Cardiovascular: Negative.  Negative for chest pain and palpitations.   Gastrointestinal: Negative.  Negative for heartburn and nausea.   Genitourinary: Negative.  Negative for dysuria.   Musculoskeletal: Negative.  Negative for myalgias.   Skin: Negative.  Negative for rash.   Neurological: Negative.  Negative for dizziness, tingling and headaches.   Endo/Heme/Allergies: Negative.  Does not bruise/bleed easily.   Psychiatric/Behavioral: Negative.  Negative for depression and suicidal ideas.    All other systems reviewed and are negative.             Objective     /82 (BP Location: Right arm, Patient Position: Sitting, BP Cuff Size: Adult)   Pulse 87   Temp 36.6 °C (97.8 °F) (Temporal)   Resp 16   Ht 1.6 m (5' 3\")   Wt 59.2 kg (130 lb 9.6 oz)   SpO2 98%   BMI 23.13 kg/m²      Physical Exam  Vitals and nursing note reviewed.   Constitutional:       General: She is not in acute distress.     Appearance: She is well-developed. She is not " diaphoretic.   HENT:      Head: Normocephalic and atraumatic.      Mouth/Throat:      Pharynx: No oropharyngeal exudate.   Eyes:      Pupils: Pupils are equal, round, and reactive to light.   Cardiovascular:      Rate and Rhythm: Normal rate and regular rhythm.      Heart sounds: Normal heart sounds. No murmur heard.     No friction rub. No gallop.   Pulmonary:      Effort: Pulmonary effort is normal. No respiratory distress.      Breath sounds: Normal breath sounds. No wheezing or rales.   Chest:      Chest wall: No tenderness.   Neurological:      Mental Status: She is alert and oriented to person, place, and time.   Psychiatric:         Behavior: Behavior normal.         Thought Content: Thought content normal.         Judgment: Judgment normal.                           Assessment & Plan        1. Benign essential HTN    - buPROPion (WELLBUTRIN XL) 150 MG XL tablet; Take 1 Tablet by mouth every morning.  Dispense: 90 Tablet; Refill: 3  - triamterene-hctz (MAXZIDE-25/DYAZIDE) 37.5-25 MG Tab; Take 1 Tablet by mouth every day.  Dispense: 90 Tablet; Refill: 3  - Lipid Profile; Future  - FREE THYROXINE; Future  - TRIIDOTHYRONINE; Future  - TSH; Future  - CBC WITHOUT DIFFERENTIAL; Future  - Comp Metabolic Panel; Future    2. Encounter for screening mammogram for breast cancer    - MA-SCREENING MAMMO BILAT W/TOMOSYNTHESIS W/CAD; Future  - Lipid Profile; Future  - FREE THYROXINE; Future  - TRIIDOTHYRONINE; Future  - TSH; Future  - CBC WITHOUT DIFFERENTIAL; Future  - Comp Metabolic Panel; Future    3. Colon cancer screening    - Referral to Gastroenterology  - Lipid Profile; Future  - FREE THYROXINE; Future  - TRIIDOTHYRONINE; Future  - TSH; Future  - CBC WITHOUT DIFFERENTIAL; Future  - Comp Metabolic Panel; Future    4. Well adult exam    - Lipid Profile; Future  - FREE THYROXINE; Future  - TRIIDOTHYRONINE; Future  - TSH; Future  - CBC WITHOUT DIFFERENTIAL; Future  - Comp Metabolic Panel; Future

## 2024-03-11 ENCOUNTER — HOSPITAL ENCOUNTER (OUTPATIENT)
Dept: RADIOLOGY | Facility: MEDICAL CENTER | Age: 59
End: 2024-03-11
Payer: COMMERCIAL

## 2024-03-12 ENCOUNTER — HOSPITAL ENCOUNTER (OUTPATIENT)
Dept: RADIOLOGY | Facility: MEDICAL CENTER | Age: 59
End: 2024-03-12
Attending: FAMILY MEDICINE
Payer: COMMERCIAL

## 2024-03-12 DIAGNOSIS — Z12.31 ENCOUNTER FOR SCREENING MAMMOGRAM FOR BREAST CANCER: ICD-10-CM

## 2024-03-12 PROCEDURE — 77067 SCR MAMMO BI INCL CAD: CPT

## 2024-03-22 ENCOUNTER — HOSPITAL ENCOUNTER (OUTPATIENT)
Dept: RADIOLOGY | Facility: MEDICAL CENTER | Age: 59
End: 2024-03-22
Attending: FAMILY MEDICINE
Payer: COMMERCIAL

## 2024-03-22 DIAGNOSIS — R92.8 ABNORMAL MAMMOGRAM: ICD-10-CM

## 2024-03-22 PROCEDURE — G0279 TOMOSYNTHESIS, MAMMO: HCPCS | Mod: RT

## 2024-03-22 PROCEDURE — 76642 ULTRASOUND BREAST LIMITED: CPT | Mod: RT

## 2025-03-03 ENCOUNTER — APPOINTMENT (OUTPATIENT)
Dept: MEDICAL GROUP | Facility: PHYSICIAN GROUP | Age: 60
End: 2025-03-03
Payer: COMMERCIAL

## 2025-03-03 VITALS
OXYGEN SATURATION: 96 % | SYSTOLIC BLOOD PRESSURE: 114 MMHG | HEIGHT: 63 IN | DIASTOLIC BLOOD PRESSURE: 60 MMHG | HEART RATE: 61 BPM | BODY MASS INDEX: 22.93 KG/M2 | RESPIRATION RATE: 20 BRPM | WEIGHT: 129.4 LBS | TEMPERATURE: 98.3 F

## 2025-03-03 DIAGNOSIS — Z12.11 COLON CANCER SCREENING: ICD-10-CM

## 2025-03-03 DIAGNOSIS — Z12.31 ENCOUNTER FOR SCREENING MAMMOGRAM FOR MALIGNANT NEOPLASM OF BREAST: ICD-10-CM

## 2025-03-03 DIAGNOSIS — Z12.31 ENCOUNTER FOR SCREENING MAMMOGRAM FOR BREAST CANCER: ICD-10-CM

## 2025-03-03 DIAGNOSIS — I10 BENIGN ESSENTIAL HTN: ICD-10-CM

## 2025-03-03 PROCEDURE — 99214 OFFICE O/P EST MOD 30 MIN: CPT | Performed by: FAMILY MEDICINE

## 2025-03-03 PROCEDURE — 3074F SYST BP LT 130 MM HG: CPT | Performed by: FAMILY MEDICINE

## 2025-03-03 PROCEDURE — 3078F DIAST BP <80 MM HG: CPT | Performed by: FAMILY MEDICINE

## 2025-03-03 RX ORDER — BUPROPION HYDROCHLORIDE 150 MG/1
150 TABLET ORAL EVERY MORNING
Qty: 90 TABLET | Refills: 3 | Status: SHIPPED | OUTPATIENT
Start: 2025-03-03 | End: 2025-03-18

## 2025-03-03 RX ORDER — TRIAMTERENE AND HYDROCHLOROTHIAZIDE 37.5; 25 MG/1; MG/1
1 TABLET ORAL DAILY
Qty: 90 TABLET | Refills: 3 | Status: SHIPPED | OUTPATIENT
Start: 2025-03-03 | End: 2025-03-18

## 2025-03-03 ASSESSMENT — ENCOUNTER SYMPTOMS
HEADACHES: 0
BLURRED VISION: 0
CONSTITUTIONAL NEGATIVE: 1
CARDIOVASCULAR NEGATIVE: 1
MUSCULOSKELETAL NEGATIVE: 1
NAUSEA: 0
HEMOPTYSIS: 0
BRUISES/BLEEDS EASILY: 0
EYES NEGATIVE: 1
COUGH: 0
GASTROINTESTINAL NEGATIVE: 1
HEARTBURN: 0
NEUROLOGICAL NEGATIVE: 1
PALPITATIONS: 0
MYALGIAS: 0
CHILLS: 0
TINGLING: 0
DOUBLE VISION: 0
DEPRESSION: 0
RESPIRATORY NEGATIVE: 1
FEVER: 0
DIZZINESS: 0
PSYCHIATRIC NEGATIVE: 1

## 2025-03-03 ASSESSMENT — PATIENT HEALTH QUESTIONNAIRE - PHQ9: CLINICAL INTERPRETATION OF PHQ2 SCORE: 0

## 2025-03-03 NOTE — ASSESSMENT & PLAN NOTE
Orders:    buPROPion (WELLBUTRIN XL) 150 MG XL tablet; Take 1 Tablet by mouth every morning.    triamterene-hctz (MAXZIDE-25/DYAZIDE) 37.5-25 MG Tab; Take 1 Tablet by mouth every day.

## 2025-03-03 NOTE — PROGRESS NOTES
Subjective     Brent Bailey is a 59 y.o. female who presents with Medication Refill and Bump (On L wrist x 1 yr)            1. Benign essential HTN  Currently treated for HTN, taking meds with no CP or sob, monitors bp at home periodically. controlled       buPROPion (WELLBUTRIN XL) 150 MG XL tablet; Take 1 Tablet by mouth every morning.  Dispense: 90 Tablet; Refill: 3   triamterene-hctz (MAXZIDE-25/DYAZIDE) 37.5-25 MG Tab; Take 1 Tablet by mouth every day.  Dispense: 90 Tablet; Refill: 3    2. Encounter for screening mammogram for breast cancer     MA-SCREENING MAMMO BILAT W/TOMOSYNTHESIS W/CAD; Future    3. Encounter for screening mammogram for malignant neoplasm of breast      4. Colon cancer screening     Referral to GI for Colonoscopy    Past Medical History:  No date: Hypertension  No past surgical history on file.  Social History    Tobacco Use      Smoking status: Former        Packs/day: 0.00        Years: 0.5 packs/day for 20.0 years (10.0 ttl pk-yrs)        Types: Cigarettes        Start date: 1998        Quit date: 2018        Years since quittin.7      Smokeless tobacco: Never    Vaping Use      Vaping status: Never Used    Alcohol use: Yes      Comment: occassional wine    Drug use: No    Review of patient's family history indicates:  Problem: Breast Cancer      Relation: Maternal Aunt          Age of Onset: (Not Specified)      Current Outpatient Medications: ·  buPROPion (WELLBUTRIN XL) 150 MG XL tablet, Take 1 Tablet by mouth every morning., Disp: 90 Tablet, Rfl: 3·  triamterene-hctz (MAXZIDE-25/DYAZIDE) 37.5-25 MG Tab, Take 1 Tablet by mouth every day., Disp: 90 Tablet, Rfl: 3    Patient was instructed on the use of medications, either prescriptions or OTC and informed on when the appropriate follow up time period should be. In addition, patient was also instructed that should any acute worsening occur that they should notify this clinic asap or call 911.      3        Review of  "Systems   Constitutional: Negative.  Negative for chills and fever.   HENT: Negative.  Negative for hearing loss.    Eyes: Negative.  Negative for blurred vision and double vision.   Respiratory: Negative.  Negative for cough and hemoptysis.    Cardiovascular: Negative.  Negative for chest pain and palpitations.   Gastrointestinal: Negative.  Negative for heartburn and nausea.   Genitourinary: Negative.  Negative for dysuria.   Musculoskeletal: Negative.  Negative for myalgias.   Skin: Negative.  Negative for rash.   Neurological: Negative.  Negative for dizziness, tingling and headaches.   Endo/Heme/Allergies: Negative.  Does not bruise/bleed easily.   Psychiatric/Behavioral: Negative.  Negative for depression and suicidal ideas.    All other systems reviewed and are negative.             Objective     /60   Pulse 61   Temp 36.8 °C (98.3 °F) (Temporal)   Resp 20   Ht 1.6 m (5' 3\")   Wt 58.7 kg (129 lb 6.4 oz)   SpO2 96%   BMI 22.92 kg/m²      Physical Exam  Vitals and nursing note reviewed.   Constitutional:       General: She is not in acute distress.     Appearance: She is well-developed. She is not diaphoretic.   HENT:      Head: Normocephalic and atraumatic.      Mouth/Throat:      Pharynx: No oropharyngeal exudate.   Eyes:      Pupils: Pupils are equal, round, and reactive to light.   Cardiovascular:      Rate and Rhythm: Normal rate and regular rhythm.      Heart sounds: Normal heart sounds. No murmur heard.     No friction rub. No gallop.   Pulmonary:      Effort: Pulmonary effort is normal. No respiratory distress.      Breath sounds: Normal breath sounds. No wheezing or rales.   Chest:      Chest wall: No tenderness.   Neurological:      Mental Status: She is alert and oriented to person, place, and time.   Psychiatric:         Behavior: Behavior normal.         Thought Content: Thought content normal.         Judgment: Judgment normal.                                  Assessment & " Plan  Benign essential HTN    Orders:    buPROPion (WELLBUTRIN XL) 150 MG XL tablet; Take 1 Tablet by mouth every morning.    triamterene-hctz (MAXZIDE-25/DYAZIDE) 37.5-25 MG Tab; Take 1 Tablet by mouth every day.    Encounter for screening mammogram for breast cancer    Orders:    MA-SCREENING MAMMO BILAT W/TOMOSYNTHESIS W/CAD; Future    Encounter for screening mammogram for malignant neoplasm of breast         Colon cancer screening    Orders:    Referral to GI for Colonoscopy

## 2025-03-18 ENCOUNTER — HOSPITAL ENCOUNTER (OUTPATIENT)
Dept: RADIOLOGY | Facility: MEDICAL CENTER | Age: 60
End: 2025-03-18
Attending: FAMILY MEDICINE
Payer: COMMERCIAL

## 2025-03-18 DIAGNOSIS — Z12.31 ENCOUNTER FOR SCREENING MAMMOGRAM FOR BREAST CANCER: ICD-10-CM

## 2025-03-18 DIAGNOSIS — I10 BENIGN ESSENTIAL HTN: ICD-10-CM

## 2025-03-18 PROCEDURE — 77067 SCR MAMMO BI INCL CAD: CPT

## 2025-03-18 RX ORDER — TRIAMTERENE AND HYDROCHLOROTHIAZIDE 37.5; 25 MG/1; MG/1
1 TABLET ORAL DAILY
Qty: 90 TABLET | Refills: 3 | Status: SHIPPED | OUTPATIENT
Start: 2025-03-18

## 2025-03-18 RX ORDER — BUPROPION HYDROCHLORIDE 150 MG/1
150 TABLET ORAL EVERY MORNING
Qty: 90 TABLET | Refills: 3 | Status: SHIPPED | OUTPATIENT
Start: 2025-03-18

## 2025-03-18 NOTE — TELEPHONE ENCOUNTER
Received request via: Pharmacy    Was the patient seen in the last year in this department? Yes    Does the patient have an active prescription (recently filled or refills available) for medication(s) requested? No    [id number] [place], [place] - [address] AT [address]     Does the patient have residential Plus and need 100-day supply? (This applies to ALL medications) Patient does not have SCP

## 2025-04-11 ENCOUNTER — HOSPITAL ENCOUNTER (OUTPATIENT)
Dept: RADIOLOGY | Facility: MEDICAL CENTER | Age: 60
End: 2025-04-11
Attending: FAMILY MEDICINE
Payer: COMMERCIAL

## 2025-04-11 DIAGNOSIS — R92.8 ABNORMAL MAMMOGRAM: ICD-10-CM

## 2025-04-11 PROCEDURE — 76642 ULTRASOUND BREAST LIMITED: CPT | Mod: RT

## 2025-04-11 PROCEDURE — 77065 DX MAMMO INCL CAD UNI: CPT | Mod: RT

## 2025-04-18 ENCOUNTER — HOSPITAL ENCOUNTER (OUTPATIENT)
Dept: RADIOLOGY | Facility: MEDICAL CENTER | Age: 60
End: 2025-04-18
Attending: FAMILY MEDICINE
Payer: COMMERCIAL

## 2025-04-18 DIAGNOSIS — R92.8 ABNORMAL FINDINGS ON DIAGNOSTIC IMAGING OF BREAST: ICD-10-CM

## 2025-04-18 LAB — PATHOLOGY CONSULT NOTE: NORMAL

## 2025-04-18 PROCEDURE — A4648 IMPLANTABLE TISSUE MARKER: HCPCS

## 2025-04-18 PROCEDURE — 88305 TISSUE EXAM BY PATHOLOGIST: CPT | Mod: 26 | Performed by: PATHOLOGY

## 2025-04-18 PROCEDURE — 88305 TISSUE EXAM BY PATHOLOGIST: CPT | Performed by: PATHOLOGY

## 2025-04-21 ENCOUNTER — TELEPHONE (OUTPATIENT)
Dept: RADIOLOGY | Facility: MEDICAL CENTER | Age: 60
End: 2025-04-21
Payer: COMMERCIAL